# Patient Record
Sex: MALE | Race: WHITE | NOT HISPANIC OR LATINO | Employment: UNEMPLOYED | URBAN - METROPOLITAN AREA
[De-identification: names, ages, dates, MRNs, and addresses within clinical notes are randomized per-mention and may not be internally consistent; named-entity substitution may affect disease eponyms.]

---

## 2019-08-05 ENCOUNTER — HOSPITAL ENCOUNTER (EMERGENCY)
Facility: HOSPITAL | Age: 29
Discharge: HOME | End: 2019-08-05
Admitting: EMERGENCY MEDICINE

## 2019-08-05 ENCOUNTER — APPOINTMENT (OUTPATIENT)
Dept: GENERAL RADIOLOGY | Facility: HOSPITAL | Age: 29
End: 2019-08-05

## 2019-08-05 VITALS
DIASTOLIC BLOOD PRESSURE: 83 MMHG | SYSTOLIC BLOOD PRESSURE: 130 MMHG | TEMPERATURE: 98.2 F | OXYGEN SATURATION: 99 % | RESPIRATION RATE: 16 BRPM | HEART RATE: 56 BPM | BODY MASS INDEX: 28.75 KG/M2 | WEIGHT: 183.2 LBS | HEIGHT: 67 IN

## 2019-08-05 DIAGNOSIS — M25.522 LEFT ELBOW PAIN: Primary | ICD-10-CM

## 2019-08-05 PROCEDURE — 99283 EMERGENCY DEPT VISIT LOW MDM: CPT

## 2019-08-05 PROCEDURE — 73070 X-RAY EXAM OF ELBOW: CPT

## 2019-08-05 RX ORDER — IBUPROFEN 400 MG/1
800 TABLET ORAL ONCE
Status: COMPLETED | OUTPATIENT
Start: 2019-08-05 | End: 2019-08-05

## 2019-08-05 RX ORDER — DICLOFENAC SODIUM 75 MG/1
75 TABLET, DELAYED RELEASE ORAL 2 TIMES DAILY PRN
Qty: 20 TABLET | Refills: 0 | Status: SHIPPED | OUTPATIENT
Start: 2019-08-05 | End: 2019-08-17

## 2019-08-05 RX ADMIN — IBUPROFEN 800 MG: 400 TABLET ORAL at 16:05

## 2019-08-05 NOTE — DISCHARGE INSTRUCTIONS
Use diclofenac as needed for inflammation follow-up with Dr. Concepcion for further evaluation of elbow pain    Do not mix the medication with Motrin ibuprofen Advil or Aleve

## 2019-08-05 NOTE — ED PROVIDER NOTES
Subjective   Patient is a 28-year-old male with no known history to the left elbow pain and swelling. He states that is has been going on and off for a week.  He states that when he extends his arm it sometimes gets caught feels a snapping sensation in his elbow.  States it is worse when it starts to swell.  He has no numbness no tingling he denies any recent falls or injuries.  Denies fever chills            Review of Systems   Constitutional: Negative for chills, fatigue and fever.   HENT: Negative for congestion, tinnitus and trouble swallowing.    Eyes: Negative for photophobia, discharge and redness.   Respiratory: Negative for cough and shortness of breath.    Cardiovascular: Negative for chest pain and palpitations.   Gastrointestinal: Negative for abdominal pain, diarrhea, nausea and vomiting.   Genitourinary: Negative for dysuria, frequency and urgency.   Musculoskeletal: Negative for back pain, joint swelling and myalgias.   Skin: Negative for rash.   Neurological: Negative for dizziness and headaches.   Psychiatric/Behavioral: Negative for confusion.   All other systems reviewed and are negative.      No past medical history on file.    No Known Allergies    Past Surgical History:   Procedure Laterality Date   • TONSILLECTOMY         Family History   Problem Relation Age of Onset   • Diabetes Father        Social History     Socioeconomic History   • Marital status: Single     Spouse name: Not on file   • Number of children: Not on file   • Years of education: Not on file   • Highest education level: Not on file   Tobacco Use   • Smoking status: Current Every Day Smoker     Packs/day: 1.00           Objective   Physical Exam   Constitutional: He is oriented to person, place, and time. He appears well-developed and well-nourished.   HENT:   Head: Normocephalic and atraumatic.   Eyes: Conjunctivae and EOM are normal. Pupils are equal, round, and reactive to light.   Neck: Normal range of motion. Neck  supple.   Cardiovascular: Normal rate, regular rhythm, normal heart sounds and intact distal pulses.   Pulmonary/Chest: Effort normal and breath sounds normal.   Abdominal: Soft. Bowel sounds are normal.   Musculoskeletal:        Left elbow: He exhibits decreased range of motion and swelling. He exhibits no deformity and no laceration. Tenderness found. Radial head tenderness noted.        Arms:  Neurological: He is alert and oriented to person, place, and time. GCS eye subscore is 4. GCS verbal subscore is 5. GCS motor subscore is 6.   Skin: Skin is warm and dry.   Psychiatric: He has a normal mood and affect.   Vitals reviewed.      Procedures           ED Course      Labs Reviewed - No data to display  Medications   ibuprofen (ADVIL,MOTRIN) tablet 800 mg (800 mg Oral Given 8/5/19 1605)     Xr Elbow 2 View Left    Result Date: 8/5/2019  No acute abnormality is noted.  There are 2 chronic appearing calcifications in the joint.  Electronically Signed By-Sumaya Reis On:8/5/2019 5:01 PM This report was finalized on [id number] by  Sumaya Reis, .  He was placed in a sling and was distally neurovascular intact after placement.              MDM  The x-ray shows that the patient does have some chronic appearing calcifications within the joint which could be causing the catching sensation he will be sent to orthopedics given some diclofenac for pain control.  Verbalized understood discharge instructions    Final diagnoses:   Left elbow pain            Katelynn Wilson, APRN  08/05/19 8306

## 2019-08-20 ENCOUNTER — TELEPHONE (OUTPATIENT)
Dept: FAMILY MEDICINE CLINIC | Facility: CLINIC | Age: 29
End: 2019-08-20

## 2019-08-20 NOTE — TELEPHONE ENCOUNTER
Pt has an appt scheduled with you for 9/10 and pt had a mri done of his elbow . They believe he has a torn ligament and a chip of a bone floating around .  Pt need something for his pain , pt cannot work like this and pt is on his 90 day probation period and he is going to lose everything .

## 2019-08-20 NOTE — TELEPHONE ENCOUNTER
He has been seen in the office by Miguel, but not in well over a year  I have never seen him that I can find in the office and the one appointment he had with me he no showed  I do not have his MRI, I have his x-ray that was read as normal  I cannot prescribe medication without seeing  It sounds like he needs to see an orthopedist and I can give him a referral, but I would have to have that MRI report first, or I will not be able to get him an appointment

## 2019-08-20 NOTE — TELEPHONE ENCOUNTER
Pt went to see Dr. Dawson today and he gave him tramadol aand set pt up for surgery next Thursday

## 2019-08-24 ENCOUNTER — HOSPITAL ENCOUNTER (EMERGENCY)
Facility: HOSPITAL | Age: 29
Discharge: HOME | End: 2019-08-24
Admitting: EMERGENCY MEDICINE

## 2019-08-24 VITALS
DIASTOLIC BLOOD PRESSURE: 94 MMHG | BODY MASS INDEX: 28.37 KG/M2 | HEIGHT: 67 IN | RESPIRATION RATE: 16 BRPM | TEMPERATURE: 98.8 F | SYSTOLIC BLOOD PRESSURE: 149 MMHG | HEART RATE: 87 BPM | WEIGHT: 180.78 LBS | OXYGEN SATURATION: 99 %

## 2019-08-24 DIAGNOSIS — M25.522 LEFT ELBOW PAIN: Primary | ICD-10-CM

## 2019-08-24 PROCEDURE — 99282 EMERGENCY DEPT VISIT SF MDM: CPT

## 2019-08-24 RX ORDER — HYDROCODONE BITARTRATE AND ACETAMINOPHEN 5; 325 MG/1; MG/1
1 TABLET ORAL EVERY 6 HOURS PRN
Qty: 6 TABLET | Refills: 0 | OUTPATIENT
Start: 2019-08-24 | End: 2019-08-30

## 2019-08-24 RX ORDER — HYDROCODONE BITARTRATE AND ACETAMINOPHEN 5; 325 MG/1; MG/1
1 TABLET ORAL ONCE AS NEEDED
Status: DISCONTINUED | OUTPATIENT
Start: 2019-08-24 | End: 2019-08-24 | Stop reason: HOSPADM

## 2019-08-30 ENCOUNTER — HOSPITAL ENCOUNTER (EMERGENCY)
Facility: HOSPITAL | Age: 29
Discharge: HOME | End: 2019-08-30
Attending: EMERGENCY MEDICINE | Admitting: EMERGENCY MEDICINE

## 2019-08-30 VITALS
HEIGHT: 67 IN | WEIGHT: 187.39 LBS | SYSTOLIC BLOOD PRESSURE: 148 MMHG | HEART RATE: 88 BPM | BODY MASS INDEX: 29.41 KG/M2 | RESPIRATION RATE: 16 BRPM | OXYGEN SATURATION: 100 % | DIASTOLIC BLOOD PRESSURE: 77 MMHG | TEMPERATURE: 97.6 F

## 2019-08-30 DIAGNOSIS — S53.432A RADIAL COLLATERAL LIGAMENT SPRAIN OF LEFT ELBOW, INITIAL ENCOUNTER: Primary | ICD-10-CM

## 2019-08-30 DIAGNOSIS — M19.029 ELBOW ARTHRITIS: ICD-10-CM

## 2019-08-30 PROCEDURE — 99283 EMERGENCY DEPT VISIT LOW MDM: CPT

## 2019-08-30 RX ORDER — IBUPROFEN 400 MG/1
800 TABLET ORAL ONCE
Status: COMPLETED | OUTPATIENT
Start: 2019-08-30 | End: 2019-08-30

## 2019-08-30 RX ORDER — IBUPROFEN 400 MG/1
TABLET ORAL
Status: COMPLETED
Start: 2019-08-30 | End: 2019-08-30

## 2019-08-30 RX ORDER — TRAMADOL HYDROCHLORIDE 50 MG/1
50 TABLET ORAL EVERY 6 HOURS PRN
Qty: 12 TABLET | Refills: 0 | Status: SHIPPED | OUTPATIENT
Start: 2019-08-30 | End: 2019-09-01 | Stop reason: SDUPTHER

## 2019-08-30 RX ORDER — METHYLPREDNISOLONE 4 MG/1
TABLET ORAL
Qty: 1 EACH | Refills: 0 | Status: SHIPPED | OUTPATIENT
Start: 2019-08-30 | End: 2022-03-15

## 2019-08-30 RX ADMIN — IBUPROFEN 800 MG: 400 TABLET ORAL at 22:02

## 2019-09-01 ENCOUNTER — HOSPITAL ENCOUNTER (EMERGENCY)
Facility: HOSPITAL | Age: 29
Discharge: HOME | End: 2019-09-01
Admitting: EMERGENCY MEDICINE

## 2019-09-01 VITALS
OXYGEN SATURATION: 99 % | HEIGHT: 67 IN | RESPIRATION RATE: 17 BRPM | DIASTOLIC BLOOD PRESSURE: 78 MMHG | SYSTOLIC BLOOD PRESSURE: 122 MMHG | WEIGHT: 180.34 LBS | HEART RATE: 88 BPM | BODY MASS INDEX: 28.3 KG/M2 | TEMPERATURE: 98.1 F

## 2019-09-01 DIAGNOSIS — M25.522 LEFT ELBOW PAIN: ICD-10-CM

## 2019-09-01 DIAGNOSIS — Z78.9 HAS RUN OUT OF MEDICATIONS: Primary | ICD-10-CM

## 2019-09-01 PROCEDURE — 99283 EMERGENCY DEPT VISIT LOW MDM: CPT

## 2019-09-01 RX ORDER — TRAMADOL HYDROCHLORIDE 50 MG/1
50 TABLET ORAL ONCE
Status: COMPLETED | OUTPATIENT
Start: 2019-09-01 | End: 2019-09-01

## 2019-09-01 RX ORDER — TRAMADOL HYDROCHLORIDE 50 MG/1
50 TABLET ORAL ONCE
Status: DISCONTINUED | OUTPATIENT
Start: 2019-09-01 | End: 2019-09-01

## 2019-09-01 RX ORDER — TRAMADOL HYDROCHLORIDE 50 MG/1
50 TABLET ORAL EVERY 6 HOURS PRN
Qty: 8 TABLET | Refills: 0 | Status: SHIPPED | OUTPATIENT
Start: 2019-09-01 | End: 2019-09-10

## 2019-09-01 RX ADMIN — TRAMADOL HYDROCHLORIDE 50 MG: 50 TABLET, COATED ORAL at 19:49

## 2019-09-01 NOTE — ED NOTES
"Pt reports he has doubled up on his pain medication and it doesn't help. Reports he wants something to kill the pain. Reports he \"doesn't want a shit ton of medication just a little bit.\"      Penelope Rees LPN  09/01/19 1900    "

## 2019-09-01 NOTE — DISCHARGE INSTRUCTIONS
Take medications as prescribed, do not double up on your medication as discussed.  Rotate heat and ice every 2 hours while awake, on for 20 minutes.  Keep your appointments with Dr. Rayo and Dr. Bender.  The ER is not a place to refill your medications, and you have established providers that need to manage her medications.  Return to the ER for new or worsening symptoms.

## 2019-09-01 NOTE — ED NOTES
Pt reports elbow pain x couple months. Pt seen here 3 x this month for same. Pt is supposed to have surgery on elbow but unable to have surgery currently because he is within his 90 at his job. Pt reports we sent him home with tramadol 2 days ago and reports that is does not help. Reports that 2 visits ago he was sent home with Norco 5/325mg and that seems to help most.     Penelope Rees, LILY  09/01/19 7734

## 2019-09-02 NOTE — ED PROVIDER NOTES
"Subjective   28-year-old male presents for a refill of medications for his 6 month h/o L elbow pain.  Patient reports that he was seen here on 8/30/2019 and given tramadol which is \"It's not really helping, that is what Dr. Bender gave me. I just need something stronger. I just started a new job and I can't miss work for ninety days or they will fire me. I can't do the surgery until then. That one practitioner here gave me Nocro and that helped a whole lot more.\"     1. Location: Left elbow  2. Quality: Throbbing  3. Severity: Moderate  4. Worsening factors: Movement  5. Alleviating factors: Norco  6. Onset: 6 months  7. Radiation: None  8. Frequency: Constant with periods of intensity  9. Co-morbidities: Reported torn ligament of left elbow  10. Source: Patient                  Review of Systems   Musculoskeletal: Positive for arthralgias and myalgias. Negative for joint swelling.   Skin: Negative for color change and pallor.   Neurological: Negative for numbness.   All other systems reviewed and are negative.      No past medical history on file.    No Known Allergies    Past Surgical History:   Procedure Laterality Date   • DENTAL PROCEDURE     • TONSILLECTOMY         Family History   Problem Relation Age of Onset   • Diabetes Father        Social History     Socioeconomic History   • Marital status: Single     Spouse name: Not on file   • Number of children: Not on file   • Years of education: Not on file   • Highest education level: Not on file   Tobacco Use   • Smoking status: Current Every Day Smoker     Packs/day: 1.00   • Smokeless tobacco: Never Used   Substance and Sexual Activity   • Drug use: No           Objective   Physical Exam   Constitutional: He is oriented to person, place, and time. He appears well-developed and well-nourished. He is active.   Cardiovascular: Intact distal pulses.   Pulses:       Radial pulses are 2+ on the right side, and 2+ on the left side.   Musculoskeletal: He exhibits " tenderness. He exhibits no edema or deformity.        Left elbow: He exhibits decreased range of motion. He exhibits no swelling, no effusion, no deformity and no laceration. Tenderness found. Olecranon process tenderness noted. No radial head, no medial epicondyle and no lateral epicondyle tenderness noted.        Arms:  Neurological: He is alert and oriented to person, place, and time. No sensory deficit.   Skin: Skin is warm, dry and intact. Capillary refill takes less than 2 seconds. No rash noted.   Psychiatric: He has a normal mood and affect. His behavior is normal. Judgment and thought content normal.   Nursing note and vitals reviewed.      Procedures           ED Course      No radiology results for the last day  Medications   traMADol (ULTRAM) tablet 50 mg (50 mg Oral Given 9/1/19 1949)     Labs Reviewed - No data to display              MDM  Number of Diagnoses or Management Options  Has run out of medications:   Left elbow pain:   Diagnosis management comments: Chart Review: Patient seen for same complaint on 8/30/2019.  Comorbidity: Reported left elbow ligament torn  Imaging: Not warranted.  Disposition/Treatment: Discussed results with patient, verbalized understanding.  Agreeable with plan of care.    Patient undressed and placed in gown for exam.  Patient was given 1 tramadol.  Patient was encouraged to keep his appointment with his PCP and surgeon.  Encouraged to return to the ER for new or worsening symptoms.    Inspect performed.  Patient had tramadol quantity of 12 filled on 8/30/2019.  Patient given a prescription today for tramadol quantity of 8.          Final diagnoses:   Has run out of medications   Left elbow pain            Eneida Chamorro NP  09/01/19 2007

## 2019-09-10 ENCOUNTER — TELEPHONE (OUTPATIENT)
Dept: FAMILY MEDICINE CLINIC | Facility: CLINIC | Age: 29
End: 2019-09-10

## 2019-09-10 ENCOUNTER — OFFICE VISIT (OUTPATIENT)
Dept: FAMILY MEDICINE CLINIC | Facility: CLINIC | Age: 29
End: 2019-09-10

## 2019-09-10 VITALS
WEIGHT: 181.6 LBS | OXYGEN SATURATION: 97 % | HEART RATE: 82 BPM | BODY MASS INDEX: 28.5 KG/M2 | HEIGHT: 67 IN | SYSTOLIC BLOOD PRESSURE: 123 MMHG | DIASTOLIC BLOOD PRESSURE: 81 MMHG

## 2019-09-10 DIAGNOSIS — M25.522 LEFT ELBOW PAIN: ICD-10-CM

## 2019-09-10 DIAGNOSIS — Z00.01 ENCOUNTER FOR WELL ADULT EXAM WITH ABNORMAL FINDINGS: Primary | ICD-10-CM

## 2019-09-10 PROCEDURE — 99395 PREV VISIT EST AGE 18-39: CPT | Performed by: FAMILY MEDICINE

## 2019-09-10 RX ORDER — HYDROCODONE BITARTRATE 40 MG/1
1 TABLET, EXTENDED RELEASE ORAL DAILY
Qty: 30 EACH | Refills: 0 | Status: SHIPPED | OUTPATIENT
Start: 2019-09-10 | End: 2019-09-11 | Stop reason: SDUPTHER

## 2019-09-10 NOTE — PROGRESS NOTES
"Subjective   Bryce Lyon is a 28 y.o. male.     He is in today for his annual well visit but has been having some discomfort in his left arm of late. He had MRI done last month and he has significant issues with his radial collateral ligament, osteophytes and some tendon wear.  He has been told he needs surgery but is on a probationary period at work for the rest of the year, so he cannot have surgery for approx 3 mos or he will lose his job.           /81 (BP Location: Left arm, Patient Position: Sitting, Cuff Size: Adult)   Pulse 82   Ht 170.2 cm (67\")   Wt 82.4 kg (181 lb 9.6 oz)   SpO2 97%   BMI 28.44 kg/m²       Chief Complaint   Patient presents with   • Annual Exam           Current Outpatient Medications:   •  methylPREDNISolone (MEDROL, OLIVIER,) 4 MG tablet, Take as directed on package instructions., Disp: 1 each, Rfl: 0  •  traMADol (ULTRAM) 50 MG tablet, Take 1 tablet by mouth Every 6 (Six) Hours As Needed for Moderate Pain  or Severe Pain ., Disp: 8 tablet, Rfl: 0        The following portions of the patient's history were reviewed and updated as appropriate: allergies, current medications, past family history, past medical history, past social history, past surgical history and problem list.    Review of Systems   Constitutional: Negative for activity change, fatigue and fever.   HENT: Negative for congestion, sinus pressure, sinus pain, sore throat and trouble swallowing.    Eyes: Negative for visual disturbance.   Respiratory: Negative for chest tightness, shortness of breath and wheezing.    Cardiovascular: Negative for chest pain.   Gastrointestinal: Negative for abdominal distention, abdominal pain, constipation, diarrhea, nausea and vomiting.   Genitourinary: Negative for difficulty urinating, dysuria, frequency and urgency.   Musculoskeletal: Positive for arthralgias and myalgias. Negative for back pain and neck pain.   Skin: Negative for rash.   Neurological: Negative for dizziness, " weakness and light-headedness.   Psychiatric/Behavioral: Positive for sleep disturbance. Negative for agitation, hallucinations and suicidal ideas.       Objective   Physical Exam   Constitutional: He is oriented to person, place, and time. He appears well-developed and well-nourished. He appears distressed.   HENT:   Nose: Nose normal.   Mouth/Throat: Oropharynx is clear and moist. No oropharyngeal exudate.   Neck: Normal range of motion. Neck supple. No thyromegaly present.   Cardiovascular: Normal rate, regular rhythm and normal heart sounds.   Pulmonary/Chest: Effort normal and breath sounds normal.   Abdominal: Soft. Bowel sounds are normal. There is no guarding.   Musculoskeletal:   Some laxity in both shoulders  Unable to fully assess the left elbow due to patient refusal for examination but recent MRI indicates significant damage as mentioned above in the note  Gait normal   Lymphadenopathy:     He has no cervical adenopathy.   Neurological: He is alert and oriented to person, place, and time.   Psychiatric: He has a normal mood and affect.   Nursing note and vitals reviewed.        Assessment/Plan   Problems Addressed this Visit     None      Visit Diagnoses     Encounter for well adult exam with abnormal findings    -  Primary    Left elbow pain        Relevant Orders    Ambulatory Referral to Orthopedic Surgery      I did give the patient some pain medication, given the abnormal MRI  I gave him time release medication to try to minimize habitual nature, but if his elbow is as bad as I fear the ultimate resolution for this will be surgery  He is to update me on response to pain medication in the next couple of weeks and I will likely see him back in 2 months.  He was educated on preventive measures moving forward in regard to lifestyle/ diet  He was educated on better sleep hygiene as well          '

## 2019-09-11 ENCOUNTER — HOSPITAL ENCOUNTER (EMERGENCY)
Facility: HOSPITAL | Age: 29
Discharge: HOME | End: 2019-09-11
Admitting: EMERGENCY MEDICINE

## 2019-09-11 VITALS
OXYGEN SATURATION: 99 % | RESPIRATION RATE: 16 BRPM | HEIGHT: 67 IN | TEMPERATURE: 98 F | DIASTOLIC BLOOD PRESSURE: 90 MMHG | HEART RATE: 92 BPM | BODY MASS INDEX: 27.34 KG/M2 | SYSTOLIC BLOOD PRESSURE: 148 MMHG | WEIGHT: 174.16 LBS

## 2019-09-11 DIAGNOSIS — M25.522 LEFT ELBOW PAIN: Primary | ICD-10-CM

## 2019-09-11 PROCEDURE — 99283 EMERGENCY DEPT VISIT LOW MDM: CPT

## 2019-09-11 RX ORDER — INDOMETHACIN 25 MG/1
50 CAPSULE ORAL 3 TIMES DAILY PRN
Qty: 21 CAPSULE | Refills: 0 | Status: SHIPPED | OUTPATIENT
Start: 2019-09-11 | End: 2022-03-15

## 2019-09-11 RX ORDER — HYDROCODONE BITARTRATE 40 MG/1
1 TABLET, EXTENDED RELEASE ORAL DAILY
Qty: 30 EACH | Refills: 0 | Status: SHIPPED | OUTPATIENT
Start: 2019-09-11 | End: 2019-09-23 | Stop reason: SDUPTHER

## 2019-09-11 RX ORDER — IBUPROFEN 400 MG/1
800 TABLET ORAL ONCE
Status: COMPLETED | OUTPATIENT
Start: 2019-09-11 | End: 2019-09-11

## 2019-09-11 RX ADMIN — IBUPROFEN 800 MG: 400 TABLET ORAL at 01:10

## 2019-09-11 NOTE — ED PROVIDER NOTES
Subjective   This is the patient's fourth visit for left elbow pain.  He states that he saw his orthopedic surgeon was told that he needed to have surgery on his elbow he just started a new job and will be unable to have surgery for 3 months he states that his doctor called in [place] today but he was unable to get the prescription filled.            Review of Systems   Constitutional: Negative for fever.   Musculoskeletal: Positive for arthralgias.       No past medical history on file.    No Known Allergies    Past Surgical History:   Procedure Laterality Date   • DENTAL PROCEDURE     • TONSILLECTOMY         Family History   Problem Relation Age of Onset   • Diabetes Father        Social History     Socioeconomic History   • Marital status: Single     Spouse name: Not on file   • Number of children: Not on file   • Years of education: Not on file   • Highest education level: Not on file   Tobacco Use   • Smoking status: Current Every Day Smoker     Packs/day: 1.00   • Smokeless tobacco: Never Used   Substance and Sexual Activity   • Drug use: No           Objective   Physical Exam  28-year-old gentleman in no acute distress examination of the left elbow visually there is no obvious abnormality no bruising swelling deformity no erythema or warmth tender to palpation no palpable abnormality range of motion restricted due to pain is no crepitus  Procedures           ED Course      Patient will be given a prescription for indomethacin            MDM    Final diagnoses:   Left elbow pain              Connor Castillo, KHADIJAH  09/11/19 0043

## 2019-09-11 NOTE — ED NOTES
"Pt c/o L elbow pain \"for a while\". Pt requesting pain medication so that he can go to work. Pt states he has a pain medication RX at the pharmacy but the pharmacy is unable to fill the RX at this time.      Marcia Fitzpatrick, LPN  09/11/19 0112    "

## 2019-09-12 ENCOUNTER — TELEPHONE (OUTPATIENT)
Dept: FAMILY MEDICINE CLINIC | Facility: CLINIC | Age: 29
End: 2019-09-12

## 2019-09-17 ENCOUNTER — HOSPITAL ENCOUNTER (EMERGENCY)
Facility: HOSPITAL | Age: 29
Discharge: HOME | End: 2019-09-17
Attending: EMERGENCY MEDICINE | Admitting: EMERGENCY MEDICINE

## 2019-09-17 VITALS
HEIGHT: 67 IN | HEART RATE: 71 BPM | WEIGHT: 179.68 LBS | SYSTOLIC BLOOD PRESSURE: 134 MMHG | BODY MASS INDEX: 28.2 KG/M2 | DIASTOLIC BLOOD PRESSURE: 80 MMHG | TEMPERATURE: 97.4 F | OXYGEN SATURATION: 100 % | RESPIRATION RATE: 14 BRPM

## 2019-09-17 DIAGNOSIS — M25.522 LEFT ELBOW PAIN: Primary | ICD-10-CM

## 2019-09-17 PROCEDURE — 99283 EMERGENCY DEPT VISIT LOW MDM: CPT

## 2019-09-17 RX ORDER — IBUPROFEN 400 MG/1
800 TABLET ORAL ONCE
Status: COMPLETED | OUTPATIENT
Start: 2019-09-17 | End: 2019-09-17

## 2019-09-17 RX ADMIN — IBUPROFEN 800 MG: 400 TABLET ORAL at 04:11

## 2019-09-17 NOTE — ED NOTES
"Pt c/o L elbow pain and swelling \"for a few months\". States \"I had an MRI done and I have some torn ligaments. I just need the swelling to go down\". Pt reports pain and swelling increases when working.     Marcia Fitzpatrick, MARLENEN  09/17/19 0401    "

## 2019-09-17 NOTE — ED PROVIDER NOTES
Subjective   Chronic left elbow pain, no new injury, followed by orthopedist.  Moderate, worse with movement.            Review of Systems   Musculoskeletal:        As per hpi       No past medical history on file.    No Known Allergies    Past Surgical History:   Procedure Laterality Date   • DENTAL PROCEDURE     • TONSILLECTOMY         Family History   Problem Relation Age of Onset   • Diabetes Father        Social History     Socioeconomic History   • Marital status: Single     Spouse name: Not on file   • Number of children: Not on file   • Years of education: Not on file   • Highest education level: Not on file   Tobacco Use   • Smoking status: Current Every Day Smoker     Packs/day: 1.00   • Smokeless tobacco: Never Used   Substance and Sexual Activity   • Drug use: No           Objective   Physical Exam   Constitutional: He is oriented to person, place, and time. He appears well-developed and well-nourished.   HENT:   Head: Normocephalic and atraumatic.   Musculoskeletal:   Left elbow with normal inspection, nontender, pain medially with FROM.   Neurological: He is alert and oriented to person, place, and time.   LUE motor and sensation intact   Skin: Skin is warm and dry. Capillary refill takes less than 2 seconds.   Psychiatric: He has a normal mood and affect. His behavior is normal.       Procedures           ED Course                  MDM    Final diagnoses:   Left elbow pain              Julián Song MD  09/17/19 4629

## 2019-09-23 ENCOUNTER — TELEPHONE (OUTPATIENT)
Dept: FAMILY MEDICINE CLINIC | Facility: CLINIC | Age: 29
End: 2019-09-23

## 2019-09-23 ENCOUNTER — HOSPITAL ENCOUNTER (EMERGENCY)
Facility: HOSPITAL | Age: 29
Discharge: HOME | End: 2019-09-23
Attending: NURSE PRACTITIONER | Admitting: EMERGENCY MEDICINE

## 2019-09-23 VITALS
OXYGEN SATURATION: 97 % | RESPIRATION RATE: 18 BRPM | TEMPERATURE: 98.4 F | SYSTOLIC BLOOD PRESSURE: 131 MMHG | HEIGHT: 67 IN | WEIGHT: 185 LBS | DIASTOLIC BLOOD PRESSURE: 77 MMHG | BODY MASS INDEX: 29.03 KG/M2 | HEART RATE: 87 BPM

## 2019-09-23 DIAGNOSIS — M25.522 LEFT ELBOW PAIN: Primary | ICD-10-CM

## 2019-09-23 PROCEDURE — 99283 EMERGENCY DEPT VISIT LOW MDM: CPT

## 2019-09-23 RX ORDER — HYDROCODONE BITARTRATE AND ACETAMINOPHEN 7.5; 325 MG/1; MG/1
1 TABLET ORAL ONCE
Status: COMPLETED | OUTPATIENT
Start: 2019-09-23 | End: 2019-09-23

## 2019-09-23 RX ORDER — HYDROCODONE BITARTRATE 40 MG/1
1 TABLET, EXTENDED RELEASE ORAL DAILY
Qty: 30 EACH | Refills: 0 | Status: SHIPPED | OUTPATIENT
Start: 2019-09-23 | End: 2019-10-23 | Stop reason: SDUPTHER

## 2019-09-23 RX ADMIN — HYDROCODONE BITARTRATE AND ACETAMINOPHEN 1 TABLET: 7.5; 325 TABLET ORAL at 03:16

## 2019-09-23 NOTE — DISCHARGE INSTRUCTIONS
Follow-up with primary care physician and with orthopedic physician above for continued evaluation of symptoms, range of motion exercises daily to help reduce joint stiffness;    To the ER for worsening symptoms including increased pain, swelling discoloration or coldness of extremity, fever or chills

## 2019-09-23 NOTE — ED PROVIDER NOTES
Subjective   Context: 28-year-old male patient presents the ER for evaluation of pain to his left elbow; patient reports that he was evaluated by his primary care physician 2 weeks ago, states that he was having difficulty feeling his chronic pain medication, states that tonight at work he noticed increased tenderness to the area.  He reports no redness or swelling.  Denies direct injury or trauma.  Patient reports that because he is having persistent increased pain he has had increased anxiety.  Denies suicidal homicidal ideation; reports he is able to function in his normal daily activities but states that becoming very aggravated because he is unable to get his prescribed medication due to recurrence.      Location:   Quality:  Timing:  Duration:   Aggravating:  Alleviating:    PCP:  Girma            Review of Systems   Constitutional: Negative for fever.   Respiratory: Positive for cough and shortness of breath.    Cardiovascular: Positive for chest pain.   Musculoskeletal: Positive for arthralgias. Negative for myalgias, neck pain and neck stiffness.   Psychiatric/Behavioral: Positive for hallucinations and sleep disturbance. Negative for agitation, self-injury and suicidal ideas. The patient is nervous/anxious.      Prior to Admission medications    Medication Sig Start Date End Date Taking? Authorizing Provider   HYDROcodone Bitartrate ER 40 MG tablet extended-release 24 hour  Take 1 tablet by mouth Daily. 9/11/19   Alexander Rayo MD   indomethacin (INDOCIN) 25 MG capsule Take 2 capsules by mouth 3 (Three) Times a Day As Needed for Mild Pain . 9/11/19   Connor Castillo NP   methylPREDNISolone (MEDROL, OLIVIER,) 4 MG tablet Take as directed on package instructions. 8/30/19   Felipe Zapien MD         No past medical history on file.    No Known Allergies    Past Surgical History:   Procedure Laterality Date   • DENTAL PROCEDURE     • TONSILLECTOMY         Family History   Problem Relation Age of  Onset   • Diabetes Father        Social History     Socioeconomic History   • Marital status: Single     Spouse name: Not on file   • Number of children: Not on file   • Years of education: Not on file   • Highest education level: Not on file   Tobacco Use   • Smoking status: Current Every Day Smoker     Packs/day: 1.00   • Smokeless tobacco: Never Used   Substance and Sexual Activity   • Drug use: No           Objective   Physical Exam    Triage nursing notes, vital signs reviewed    Constitutional: Well-developed well-nourished, no acute distress is noted, appearance is nontoxic  HENT: Normocephalic, atraumatic, no facial symmetry, no slurred speech; pupils are PERRL with intact EOM  Cardiopulmonary:   Distal pulses 2+ and intact with rapid capillary refill; respirations regular nonlabored  Musculoskeletal: Wrist with palpation of the left elbow, there is no palpable effusion no overlying erythema or ecchymosis, no bursal swelling, range of motion is present but increases tenderness, no obvious bony abnormality is noted, range of motion left shoulder, wrist and fingers is present without palpable tenderness or edema, distal motor sensory vascular status is intact.  No hypertrophy or atrophy of musculature.  Neuro: Motor strength 5/5: Left radial ulnar median nerve function intact  SKIN: Skin flesh, warm dry intact, no petechial rash or lesion    Procedures           ED Course                  MDM  Number of Diagnoses or Management Options  Left elbow pain:   Risk of Complications, Morbidity, and/or Mortality  General comments: Comorbidities:  Past medical history, allergies, current medications family history social history noted above    Previous records reviewed:    Review and summarization of lab specimens, radiology:  ED tests reviewed by me    Consultation:    Differentials: Pain, sprain: There is no evidence of cellulitis, septic arthritis, bursitis on physical exam; this list is not all inclusive and does  not constitute the entirety of considered causes              INSPECT was performed patient was noted to have numerous prescriptions for pain medications; this time, the patient is instructed to follow-up with his primary care physician for clarification on his prescribed pain medication, he will be given hydrocodone in the ED however at this time the patient had to wait for a ride before he received.  Upon receiving, he voices understanding of the importance of follow-up as well as signs and symptoms require immediate return to ED he is discharged improved stable condition ambulatory with an upright steady gait.    Final diagnoses:   Left elbow pain              Dipika Chase, KHADIJAH  09/23/19 0322

## 2019-09-23 NOTE — ED NOTES
Pt reports he has torn ligaments in his left elbow, states he has not been able to get his pain medication from the pharmacy, also states he broke up with his girlfriend and is sad     Reno Aldridge RN  09/23/19 0058

## 2019-09-30 ENCOUNTER — TELEPHONE (OUTPATIENT)
Dept: FAMILY MEDICINE CLINIC | Facility: CLINIC | Age: 29
End: 2019-09-30

## 2019-09-30 NOTE — TELEPHONE ENCOUNTER
Hysingla ER has been approved # 30 per 30 days starting 9/24/19 it is approved for 91 day only   [id number]

## 2019-10-01 ENCOUNTER — HOSPITAL ENCOUNTER (EMERGENCY)
Facility: HOSPITAL | Age: 29
Discharge: HOME | End: 2019-10-01
Admitting: EMERGENCY MEDICINE

## 2019-10-01 VITALS
TEMPERATURE: 97.9 F | SYSTOLIC BLOOD PRESSURE: 115 MMHG | BODY MASS INDEX: 29.03 KG/M2 | DIASTOLIC BLOOD PRESSURE: 58 MMHG | HEART RATE: 75 BPM | OXYGEN SATURATION: 99 % | RESPIRATION RATE: 17 BRPM | HEIGHT: 67 IN | WEIGHT: 185 LBS

## 2019-10-01 DIAGNOSIS — M25.522 LEFT ELBOW PAIN: Primary | ICD-10-CM

## 2019-10-01 PROCEDURE — 99283 EMERGENCY DEPT VISIT LOW MDM: CPT

## 2019-10-01 RX ORDER — NAPROXEN 500 MG/1
500 TABLET ORAL 2 TIMES DAILY WITH MEALS
Qty: 12 TABLET | Refills: 0 | Status: SHIPPED | OUTPATIENT
Start: 2019-10-01 | End: 2021-03-11

## 2019-10-01 RX ORDER — TRAMADOL HYDROCHLORIDE 50 MG/1
50 TABLET ORAL ONCE
Status: COMPLETED | OUTPATIENT
Start: 2019-10-01 | End: 2019-10-01

## 2019-10-01 RX ADMIN — TRAMADOL HYDROCHLORIDE 50 MG: 50 TABLET, FILM COATED ORAL at 07:22

## 2019-10-01 NOTE — DISCHARGE INSTRUCTIONS
Continue to take Hysingla ER 40 mg for pain per prescription instructions.  May take naproxen 2 times a day as needed for pain and inflammation.  Take naproxen with food.  Do not mix with Advil, Aleve, ibuprofen or diclofenac.    Follow-up with primary care provider Dr. Rayo for further management of pain medications.  Follow-up with Dr. Concepcion for surgical management and further evaluation.    Return to ER for new or worsening symptoms, increased elbow pain, swelling, redness, signs of infection, chest pain, shortness of breath or fever.

## 2019-10-01 NOTE — ED PROVIDER NOTES
"Subjective   Patient is a 28-year-old  male with history of chronic left elbow pain who presents to the ER complaining of worsening left elbow pain.  Patient states that he has been having pain in his left elbow for months.  He denies any exacerbating injury, fall or trauma.  Patient sees his primary care provider Dr. Rayo and orthopedist Dr. Concepcion for management.  Patient states that he had MRI and x-ray done per Dr. Concepcion which showed \"torn ligaments\".  Patient verbalizes that Dr. Concepcion is suggesting for the patient to have surgery however patient states that due to his insurance and his new job he is able to have surgery at this time.  Patient has been seen at least 3 times in this ER for the past month for similar pain.  Patient was prescribed hydrocodone bitartrate ER 40 mg per his primary care Dr. Rayo, but the patient states that the long-lasting medication does not seem to help.  Patient reports taking it as prescribed.  Patient has no other complaints at this time        History provided by:  Patient      Review of Systems   Constitutional: Negative for fever.   HENT: Negative for sinus pressure, sore throat and trouble swallowing.    Respiratory: Negative for shortness of breath and wheezing.    Cardiovascular: Negative for chest pain.   Gastrointestinal: Negative for abdominal pain.   Genitourinary: Negative for dysuria.   Musculoskeletal: Positive for arthralgias and myalgias. Negative for joint swelling.        Left elbow pain   Skin: Negative for rash.   Neurological: Negative for headaches.   Psychiatric/Behavioral: Negative for behavioral problems.       No past medical history on file.    No Known Allergies    Past Surgical History:   Procedure Laterality Date   • DENTAL PROCEDURE     • TONSILLECTOMY         Family History   Problem Relation Age of Onset   • Diabetes Father        Social History     Socioeconomic History   • Marital status: Single     Spouse name: Not on " file   • Number of children: Not on file   • Years of education: Not on file   • Highest education level: Not on file   Tobacco Use   • Smoking status: Current Every Day Smoker     Packs/day: 1.00   • Smokeless tobacco: Never Used   Substance and Sexual Activity   • Drug use: No           Objective   Physical Exam   Constitutional: He is oriented to person, place, and time. He appears well-developed and well-nourished.   HENT:   Head: Normocephalic and atraumatic.   Eyes: EOM are normal. Pupils are equal, round, and reactive to light.   Cardiovascular: Normal rate, regular rhythm, normal heart sounds and intact distal pulses.   Pulmonary/Chest: Effort normal and breath sounds normal. He has no wheezes.   Abdominal: Soft. Bowel sounds are normal. There is no tenderness.   Musculoskeletal: He exhibits tenderness. He exhibits no edema.   Chronic elbow pain, minimal tenderness to palpation mostly on medial side of left elbow.  No erythema, edema, warmth or signs of infection.  No lacerations or abrasions.  Increased pain with left elbow extension.  Distal pulses present left upper extremity   Neurological: He is alert and oriented to person, place, and time.   Skin: Skin is warm and dry. Capillary refill takes less than 2 seconds. No rash noted.   Psychiatric: He has a normal mood and affect. His behavior is normal.   Vitals reviewed.      Procedures           ED Course  ED Course as of Oct 01 0811   Tue Oct 01, 2019   0746 On initial examination patient appeared tearful and uncomfortable, unwilling to move his left elbow.  On reexamination patient is sleeping quietly in the bed.  [MM]   0747 Inspect was queried and patient was noted to have fill prescription for Hysingla ER 40 mg on 9/24/2019 from Dr. Rayo, #30.  Patient has full month's prescription for pain medicine for his chronic elbow pain.  Patient will be discharged and advised to follow-up with his primary care provider as well as Dr. Concepcion.  [MM]     "  ED Course User Index  [MM] Ely Hager PA    /70   Pulse 83   Temp 98.1 °F (36.7 °C) (Oral)   Resp 18   Ht 170.2 cm (67\")   Wt 83.9 kg (185 lb)   SpO2 95%   BMI 28.98 kg/m²   Labs Reviewed - No data to display  Medications   traMADol (ULTRAM) tablet 50 mg (50 mg Oral Given 10/1/19 0722)     No radiology results for the last day                MDM  Number of Diagnoses or Management Options  Left elbow pain:   Diagnosis management comments: Patient was seen by myself in the ER.  There is no obvious deformities, abrasions or signs of trauma or signs of infection in left elbow.  Patient states that pain seems worse because it is uncontrolled with long-acting pain medication per Dr. Rayo.  Inspect was queried and patient was noted to have numerous prescriptions for pain medications.  Patient was advised to follow-up with his primary care provider for further management of his chronic left elbow pain as well as follow-up with Dr. Concepcion to discuss possible surgery.  Patient was given tramadol 50 mg in the ER and reports some pain relief.  Patient will be discharged with prescription for naproxen 500 to be taken twice a day, with meals for pain and inflammation.  Discussed discharge instructions and follow-up instructions with patient who verbalized understanding.  Discussed the patient signs and symptoms that require immediate return to the ED. she was discharged in improved stable condition, he was ambulatory with upright steady gait.      Final diagnoses:   Left elbow pain              Ely Hager PA  10/01/19 0811    "

## 2019-10-21 ENCOUNTER — TELEPHONE (OUTPATIENT)
Dept: FAMILY MEDICINE CLINIC | Facility: CLINIC | Age: 29
End: 2019-10-21

## 2019-10-23 RX ORDER — HYDROCODONE BITARTRATE 40 MG/1
1 TABLET, EXTENDED RELEASE ORAL DAILY
Qty: 30 EACH | Refills: 0 | Status: SHIPPED | OUTPATIENT
Start: 2019-10-23 | End: 2019-10-23 | Stop reason: SDUPTHER

## 2019-10-23 RX ORDER — HYDROCODONE BITARTRATE 40 MG/1
1 TABLET, EXTENDED RELEASE ORAL DAILY
Qty: 30 EACH | Refills: 0 | OUTPATIENT
Start: 2019-10-23 | End: 2020-04-23

## 2019-10-23 NOTE — TELEPHONE ENCOUNTER
Spoke to [facility] pharmacist and he stated that the hydrocodone  just needs a refill not a prior authorization .

## 2019-11-20 ENCOUNTER — OFFICE VISIT (OUTPATIENT)
Dept: FAMILY MEDICINE CLINIC | Facility: CLINIC | Age: 29
End: 2019-11-20

## 2019-11-20 VITALS
DIASTOLIC BLOOD PRESSURE: 79 MMHG | WEIGHT: 181.8 LBS | BODY MASS INDEX: 28.53 KG/M2 | SYSTOLIC BLOOD PRESSURE: 122 MMHG | HEART RATE: 89 BPM | HEIGHT: 67 IN | OXYGEN SATURATION: 100 % | TEMPERATURE: 97.9 F

## 2019-11-20 DIAGNOSIS — F32.A DEPRESSION, UNSPECIFIED DEPRESSION TYPE: Primary | ICD-10-CM

## 2019-11-20 DIAGNOSIS — F41.9 ANXIETY: ICD-10-CM

## 2019-11-20 PROCEDURE — 99213 OFFICE O/P EST LOW 20 MIN: CPT | Performed by: FAMILY MEDICINE

## 2019-11-20 RX ORDER — ALPRAZOLAM 0.25 MG/1
0.25 TABLET ORAL 2 TIMES DAILY PRN
Qty: 60 TABLET | Refills: 0 | Status: SHIPPED | OUTPATIENT
Start: 2019-11-20 | End: 2020-01-28

## 2019-11-20 RX ORDER — GABAPENTIN 300 MG/1
300 CAPSULE ORAL 3 TIMES DAILY
Qty: 90 CAPSULE | Refills: 3 | Status: SHIPPED | OUTPATIENT
Start: 2019-11-20 | End: 2019-12-27

## 2019-11-20 NOTE — PROGRESS NOTES
"Subjective   Bryce Lyon is a 29 y.o. male.     He is in regarding depression. He is starting to avoid places and groups of people. He has had a relationship end.  He has had a falling out with his good friend.  He feels isolated but has a good relationship with his family.  He does have a Religion but has stopped going because that is where his ex went.  He is not socializing or circulating with anyone at the moment.  He is working.  He is open to counseling.           /79 (BP Location: Left arm, Patient Position: Sitting, Cuff Size: Adult)   Pulse 89   Temp 97.9 °F (36.6 °C) (Oral)   Ht 170.2 cm (67\")   Wt 82.5 kg (181 lb 12.8 oz)   SpO2 100%   BMI 28.47 kg/m²       Chief Complaint   Patient presents with   • Depression     pt wants to discuss depression    • Anxiety           Current Outpatient Medications:   •  HYDROcodone Bitartrate ER 40 MG tablet extended-release 24 hour , Take 1 tablet by mouth Daily., Disp: 30 each, Rfl: 0  •  ALPRAZolam (XANAX) 0.25 MG tablet, Take 1 tablet by mouth 2 (Two) Times a Day As Needed for Anxiety., Disp: 60 tablet, Rfl: 0  •  gabapentin (NEURONTIN) 300 MG capsule, Take 1 capsule by mouth 3 (Three) Times a Day., Disp: 90 capsule, Rfl: 3  •  indomethacin (INDOCIN) 25 MG capsule, Take 2 capsules by mouth 3 (Three) Times a Day As Needed for Mild Pain ., Disp: 21 capsule, Rfl: 0  •  methylPREDNISolone (MEDROL, OLIVIER,) 4 MG tablet, Take as directed on package instructions., Disp: 1 each, Rfl: 0  •  naproxen (EC NAPROSYN) 500 MG EC tablet, Take 1 tablet by mouth 2 (Two) Times a Day With Meals., Disp: 12 tablet, Rfl: 0        The following portions of the patient's history were reviewed and updated as appropriate: allergies, current medications, past family history, past medical history, past social history, past surgical history and problem list.    Review of Systems   Musculoskeletal: Positive for arthralgias.   Neurological: Negative for dizziness, weakness, " light-headedness, numbness and headaches.   Psychiatric/Behavioral: Positive for dysphoric mood and sleep disturbance. Negative for hallucinations, self-injury and suicidal ideas. The patient is nervous/anxious.        Objective   Physical Exam   Constitutional: He is oriented to person, place, and time. He appears distressed.   Neck: Normal range of motion. Neck supple. No thyromegaly present.   Cardiovascular: Normal rate, regular rhythm and normal heart sounds.   Pulmonary/Chest: Effort normal and breath sounds normal. He has no wheezes.   Neurological: He is alert and oriented to person, place, and time. He displays normal reflexes. No sensory deficit.   Psychiatric:   Affect is flat  He also seems anxious  He is not visibly shaking but he is very much on edge   Nursing note and vitals reviewed.        Assessment/Plan   Problems Addressed this Visit     None      Visit Diagnoses     Depression, unspecified depression type    -  Primary    Relevant Medications    ALPRAZolam (XANAX) 0.25 MG tablet    Anxiety            I will start him on Viibryd and titrate him up to 20 mg over the next 4 weeks  I will give him some alprazolam he can use at a low dose, only as needed, for panic or anxiety episodes  I will refer for counseling and I will see him back in 2 months  He was told to call for concerns and to go to the emergency room should he develop any suicidal ideations

## 2019-11-25 ENCOUNTER — TELEPHONE (OUTPATIENT)
Dept: FAMILY MEDICINE CLINIC | Facility: CLINIC | Age: 29
End: 2019-11-25

## 2019-12-27 ENCOUNTER — TELEPHONE (OUTPATIENT)
Dept: FAMILY MEDICINE CLINIC | Facility: CLINIC | Age: 29
End: 2019-12-27

## 2019-12-27 NOTE — TELEPHONE ENCOUNTER
It does not work that way  They do make a time release version that is 600 mg that we can try   And could even do two of those a day at some point but I would start with one

## 2019-12-27 NOTE — TELEPHONE ENCOUNTER
Pt would like to know if you will increase his gabapentin from 300 mg taking one tablet tid to 900 mg once a day  ?

## 2019-12-27 NOTE — TELEPHONE ENCOUNTER
Pt would like to try the time release 600 mg , if you will send it to [facility] on [address]  ?

## 2020-01-03 PROBLEM — J45.909 ASTHMATIC BRONCHITIS: Status: ACTIVE | Noted: 2018-08-29

## 2020-01-03 PROBLEM — K52.9 GASTROENTERITIS: Status: ACTIVE | Noted: 2018-08-17

## 2020-01-03 PROBLEM — K04.7 DENTAL ABSCESS: Status: ACTIVE | Noted: 2018-06-02

## 2020-01-03 PROBLEM — F41.1 GENERALIZED ANXIETY DISORDER: Status: ACTIVE | Noted: 2017-05-18

## 2020-01-03 PROBLEM — M25.511 PAIN IN RIGHT SHOULDER: Status: ACTIVE | Noted: 2017-05-18

## 2020-01-03 PROBLEM — M25.529 ARTHRALGIA OF UPPER ARM: Status: ACTIVE | Noted: 2020-01-03

## 2020-01-21 ENCOUNTER — OFFICE VISIT (OUTPATIENT)
Dept: FAMILY MEDICINE CLINIC | Facility: CLINIC | Age: 30
End: 2020-01-21

## 2020-01-21 VITALS
TEMPERATURE: 98.2 F | DIASTOLIC BLOOD PRESSURE: 82 MMHG | SYSTOLIC BLOOD PRESSURE: 129 MMHG | HEART RATE: 79 BPM | BODY MASS INDEX: 29.07 KG/M2 | WEIGHT: 185.6 LBS | OXYGEN SATURATION: 96 %

## 2020-01-21 DIAGNOSIS — F41.1 GENERALIZED ANXIETY DISORDER: Primary | ICD-10-CM

## 2020-01-21 PROCEDURE — 99213 OFFICE O/P EST LOW 20 MIN: CPT | Performed by: FAMILY MEDICINE

## 2020-01-21 RX ORDER — GABAPENTIN 300 MG/1
600 CAPSULE ORAL 3 TIMES DAILY
Qty: 180 CAPSULE | Refills: 1 | Status: SHIPPED | OUTPATIENT
Start: 2020-01-21 | End: 2020-03-23

## 2020-01-21 RX ORDER — IBUPROFEN 600 MG/1
TABLET ORAL
COMMUNITY
End: 2021-03-11

## 2020-01-21 RX ORDER — GABAPENTIN 300 MG/1
300 CAPSULE ORAL DAILY
COMMUNITY
Start: 2020-01-16 | End: 2020-01-21 | Stop reason: SDUPTHER

## 2020-01-21 RX ORDER — METHOCARBAMOL 750 MG/1
750 TABLET, FILM COATED ORAL DAILY
COMMUNITY
Start: 2019-11-25

## 2020-01-21 NOTE — PROGRESS NOTES
Subjective   Bryce Lyon is a 29 y.o. male.     He is in for follow-up on his anxiety issues. He misplaced the Viibryd that I gave him and did not end up taking it. He feels the gabapentin has helped his sleep and mood and he feels better with that medication. The ER version of it was not tolerated but IR version has been tolerated. He has also changed jobs and he has found one that is more comfortable for him and that he likes much better. Sleep and mood are improved since last visit. He has been eating better and exercising.         /82 (BP Location: Right arm, Patient Position: Sitting, Cuff Size: Adult)   Pulse 79   Temp 98.2 °F (36.8 °C) (Oral)   Wt 84.2 kg (185 lb 9.6 oz)   SpO2 96%   BMI 29.07 kg/m²       Chief Complaint   Patient presents with   • Anxiety     med check           Current Outpatient Medications:   •  ALPRAZolam (XANAX) 0.25 MG tablet, Take 1 tablet by mouth 2 (Two) Times a Day As Needed for Anxiety., Disp: 60 tablet, Rfl: 0  •  gabapentin (NEURONTIN) 300 MG capsule, Take 2 capsules by mouth 3 (Three) Times a Day., Disp: 180 capsule, Rfl: 1  •  HYDROcodone Bitartrate ER 40 MG tablet extended-release 24 hour , Take 1 tablet by mouth Daily., Disp: 30 each, Rfl: 0  •  indomethacin (INDOCIN) 25 MG capsule, Take 2 capsules by mouth 3 (Three) Times a Day As Needed for Mild Pain ., Disp: 21 capsule, Rfl: 0  •  methocarbamol (ROBAXIN) 750 MG tablet, Take 750 mg by mouth Daily., Disp: , Rfl:   •  methylPREDNISolone (MEDROL, OLIVIER,) 4 MG tablet, Take as directed on package instructions., Disp: 1 each, Rfl: 0  •  naproxen (EC NAPROSYN) 500 MG EC tablet, Take 1 tablet by mouth 2 (Two) Times a Day With Meals., Disp: 12 tablet, Rfl: 0  •  ibuprofen (ADVIL,MOTRIN) 600 MG tablet, , Disp: , Rfl:         The following portions of the patient's history were reviewed and updated as appropriate: allergies, current medications, past family history, past medical history, past social history, past  surgical history and problem list.    Review of Systems   Constitutional: Negative for activity change, fatigue and fever.   HENT: Negative for congestion, sinus pressure, sinus pain, sore throat and trouble swallowing.    Eyes: Negative for visual disturbance.   Respiratory: Negative for chest tightness, shortness of breath and wheezing.    Cardiovascular: Negative for chest pain.   Gastrointestinal: Negative for abdominal distention, abdominal pain, constipation, diarrhea, nausea and vomiting.   Genitourinary: Negative for difficulty urinating, dysuria, frequency and urgency.   Musculoskeletal: Negative for back pain and neck pain.   Neurological: Negative for dizziness, weakness, light-headedness, numbness and headaches.   Psychiatric/Behavioral: Positive for sleep disturbance. Negative for agitation, decreased concentration, hallucinations and suicidal ideas. The patient is not nervous/anxious.        Objective   Physical Exam   Constitutional: He is oriented to person, place, and time. No distress.   Neck: Normal range of motion. Neck supple. No thyromegaly present.   Cardiovascular: Normal rate, regular rhythm and normal heart sounds.   Pulmonary/Chest: Effort normal and breath sounds normal. He has no wheezes.   Abdominal: Soft. Bowel sounds are normal. There is no guarding.   Lymphadenopathy:     He has no cervical adenopathy.   Neurological: He is alert and oriented to person, place, and time. He displays normal reflexes.   Psychiatric:   Mood seems improved  Pt seems upbeat  No signs of depression   Nursing note and vitals reviewed.        Assessment/Plan   Problems Addressed this Visit        Other    Generalized anxiety disorder - Primary          I will change his gabapentin to the more immediate release and do 600 mg 3 times a day  I will not prescribe Viibryd as he did not take it and he seems to be improved without it  I will see him back in 3 months  I will likely get a urine drug screen between now  and the time he comes back in 3 months as part of our compliance program  I will not change any other prescriptions for now barring any unexpected findings on urine drug screen

## 2020-01-28 DIAGNOSIS — F41.1 GENERALIZED ANXIETY DISORDER: Primary | ICD-10-CM

## 2020-01-28 RX ORDER — ALPRAZOLAM 0.25 MG/1
TABLET ORAL
Qty: 60 TABLET | Refills: 0 | OUTPATIENT
Start: 2020-01-28 | End: 2020-04-23

## 2020-01-28 RX ORDER — ALPRAZOLAM 0.25 MG/1
0.25 TABLET ORAL 2 TIMES DAILY PRN
Qty: 60 TABLET | Refills: 0 | Status: SHIPPED | OUTPATIENT
Start: 2020-01-28 | End: 2020-04-19

## 2020-03-23 RX ORDER — GABAPENTIN 300 MG/1
600 CAPSULE ORAL 3 TIMES DAILY
Qty: 180 CAPSULE | Refills: 1 | Status: SHIPPED | OUTPATIENT
Start: 2020-03-23 | End: 2020-05-15

## 2020-04-18 DIAGNOSIS — F41.1 GENERALIZED ANXIETY DISORDER: ICD-10-CM

## 2020-04-19 RX ORDER — ALPRAZOLAM 0.25 MG/1
TABLET ORAL
Qty: 60 TABLET | Refills: 1 | OUTPATIENT
Start: 2020-04-19 | End: 2020-04-23

## 2020-04-20 ENCOUNTER — APPOINTMENT (OUTPATIENT)
Dept: GENERAL RADIOLOGY | Facility: HOSPITAL | Age: 30
End: 2020-04-20

## 2020-04-20 ENCOUNTER — HOSPITAL ENCOUNTER (EMERGENCY)
Facility: HOSPITAL | Age: 30
Discharge: HOME | End: 2020-04-20
Attending: EMERGENCY MEDICINE | Admitting: EMERGENCY MEDICINE

## 2020-04-20 VITALS
DIASTOLIC BLOOD PRESSURE: 63 MMHG | SYSTOLIC BLOOD PRESSURE: 124 MMHG | HEIGHT: 66 IN | HEART RATE: 80 BPM | BODY MASS INDEX: 31 KG/M2 | OXYGEN SATURATION: 97 % | TEMPERATURE: 98 F | RESPIRATION RATE: 14 BRPM | WEIGHT: 192.9 LBS

## 2020-04-20 DIAGNOSIS — K62.89 RECTAL PAIN: Primary | ICD-10-CM

## 2020-04-20 LAB
C TRACH RRNA CVX QL NAA+PROBE: NOT DETECTED
N GONORRHOEA RRNA SPEC QL NAA+PROBE: NOT DETECTED

## 2020-04-20 PROCEDURE — 87491 CHLMYD TRACH DNA AMP PROBE: CPT | Performed by: EMERGENCY MEDICINE

## 2020-04-20 PROCEDURE — 74018 RADEX ABDOMEN 1 VIEW: CPT

## 2020-04-20 PROCEDURE — 25010000002 CEFTRIAXONE PER 250 MG: Performed by: EMERGENCY MEDICINE

## 2020-04-20 PROCEDURE — 87591 N.GONORRHOEAE DNA AMP PROB: CPT | Performed by: EMERGENCY MEDICINE

## 2020-04-20 PROCEDURE — 99284 EMERGENCY DEPT VISIT MOD MDM: CPT

## 2020-04-20 PROCEDURE — 96372 THER/PROPH/DIAG INJ SC/IM: CPT

## 2020-04-20 RX ORDER — AZITHROMYCIN 250 MG/1
1000 TABLET, FILM COATED ORAL ONCE
Status: COMPLETED | OUTPATIENT
Start: 2020-04-20 | End: 2020-04-20

## 2020-04-20 RX ADMIN — AZITHROMYCIN MONOHYDRATE 1000 MG: 250 TABLET ORAL at 18:07

## 2020-04-20 RX ADMIN — LIDOCAINE HYDROCHLORIDE 250 MG: 10 INJECTION, SOLUTION EPIDURAL; INFILTRATION; INTRACAUDAL; PERINEURAL at 18:07
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
## 2020-04-20 NOTE — ED NOTES
Patient received IM injection.  Will wait 15 minutes before leaving to make sure he doesn't have a reaction.      Ange Cerda RN  04/20/20 [id number]

## 2020-04-23 ENCOUNTER — HOSPITAL ENCOUNTER (EMERGENCY)
Facility: HOSPITAL | Age: 30
Discharge: HOME | End: 2020-04-23
Admitting: EMERGENCY MEDICINE

## 2020-04-23 VITALS
HEART RATE: 104 BPM | RESPIRATION RATE: 18 BRPM | DIASTOLIC BLOOD PRESSURE: 88 MMHG | WEIGHT: 192 LBS | OXYGEN SATURATION: 99 % | SYSTOLIC BLOOD PRESSURE: 130 MMHG | HEIGHT: 66 IN | BODY MASS INDEX: 30.86 KG/M2 | TEMPERATURE: 98.5 F

## 2020-04-23 DIAGNOSIS — S20.319D: ICD-10-CM

## 2020-04-23 DIAGNOSIS — T40.1X4A DIACETYLMORPHINE OVERDOSE, UNDETERMINED INTENT, INITIAL ENCOUNTER: Primary | ICD-10-CM

## 2020-04-23 LAB
ANION GAP SERPL CALCULATED.3IONS-SCNC: 15 MMOL/L (ref 5–15)
BASOPHILS # BLD MANUAL: 0.17 10*3/MM3 (ref 0–0.2)
BASOPHILS NFR BLD AUTO: 2 % (ref 0–1.5)
BUN BLD-MCNC: 12 MG/DL (ref 6–20)
BUN/CREAT SERPL: 9.2 (ref 7–25)
CALCIUM SPEC-SCNC: 9.6 MG/DL (ref 8.6–10.5)
CHLORIDE SERPL-SCNC: 99 MMOL/L (ref 98–107)
CO2 SERPL-SCNC: 26 MMOL/L (ref 22–29)
CREAT BLD-MCNC: 1.3 MG/DL (ref 0.76–1.27)
DEPRECATED RDW RBC AUTO: 41.1 FL (ref 37–54)
EOSINOPHIL # BLD MANUAL: 0.17 10*3/MM3 (ref 0–0.4)
EOSINOPHIL NFR BLD MANUAL: 2 % (ref 0.3–6.2)
ERYTHROCYTE [DISTWIDTH] IN BLOOD BY AUTOMATED COUNT: 13.7 % (ref 12.3–15.4)
GFR SERPL CREATININE-BSD FRML MDRD: 66 ML/MIN/1.73
GIANT PLATELETS: ABNORMAL
GLUCOSE BLD-MCNC: 196 MG/DL (ref 65–99)
HCT VFR BLD AUTO: 49 % (ref 37.5–51)
HGB BLD-MCNC: 16.3 G/DL (ref 13–17.7)
LYMPHOCYTES # BLD MANUAL: 2.32 10*3/MM3 (ref 0.7–3.1)
LYMPHOCYTES NFR BLD MANUAL: 27 % (ref 19.6–45.3)
LYMPHOCYTES NFR BLD MANUAL: 7 % (ref 5–12)
MCH RBC QN AUTO: 28.7 PG (ref 26.6–33)
MCHC RBC AUTO-ENTMCNC: 33.2 G/DL (ref 31.5–35.7)
MCV RBC AUTO: 86.4 FL (ref 79–97)
METAMYELOCYTES NFR BLD MANUAL: 1 % (ref 0–0)
MONOCYTES # BLD AUTO: 0.6 10*3/MM3 (ref 0.1–0.9)
NEUTROPHILS # BLD AUTO: 5.25 10*3/MM3 (ref 1.7–7)
NEUTROPHILS NFR BLD MANUAL: 56 % (ref 42.7–76)
NEUTS BAND NFR BLD MANUAL: 5 % (ref 0–5)
PLATELET # BLD AUTO: 190 10*3/MM3 (ref 140–450)
PMV BLD AUTO: 8.5 FL (ref 6–12)
POTASSIUM BLD-SCNC: 4.3 MMOL/L (ref 3.5–5.2)
RBC # BLD AUTO: 5.67 10*6/MM3 (ref 4.14–5.8)
RBC MORPH BLD: NORMAL
SCAN SLIDE: NORMAL
SODIUM BLD-SCNC: 140 MMOL/L (ref 136–145)
WBC MORPH BLD: NORMAL
WBC NRBC COR # BLD: 8.6 10*3/MM3 (ref 3.4–10.8)

## 2020-04-23 PROCEDURE — 99284 EMERGENCY DEPT VISIT MOD MDM: CPT

## 2020-04-23 PROCEDURE — 85007 BL SMEAR W/DIFF WBC COUNT: CPT | Performed by: NURSE PRACTITIONER

## 2020-04-23 PROCEDURE — 80048 BASIC METABOLIC PNL TOTAL CA: CPT | Performed by: NURSE PRACTITIONER

## 2020-04-23 PROCEDURE — 85025 COMPLETE CBC W/AUTO DIFF WBC: CPT | Performed by: NURSE PRACTITIONER

## 2020-04-23 RX ORDER — SODIUM CHLORIDE 0.9 % (FLUSH) 0.9 %
10 SYRINGE (ML) INJECTION AS NEEDED
Status: DISCONTINUED | OUTPATIENT
Start: 2020-04-23 | End: 2020-04-24 | Stop reason: HOSPADM

## 2020-04-23 RX ADMIN — SODIUM CHLORIDE 1000 ML: 900 INJECTION, SOLUTION INTRAVENOUS at 21:44

## 2020-04-24 NOTE — ED PROVIDER NOTES
Subjective   Patient is a 29-year-old male who comes in via EMS after having a heroin overdose where he was found unconscious and was given 4 mg of Narcan nasally with return of spontaneous respirations.-    The patient reports that he snorted heroin-he states he has been doing it for about a month now.    The patient denies any pain.    EMS reports that the patient was also picked up for overdose on Sunday and was given Narcan and sternal rub-he was taken to [facility] at that time.          Review of Systems   Constitutional: Negative for chills, fatigue and fever.   HENT: Negative for congestion, tinnitus and trouble swallowing.    Eyes: Negative for photophobia, discharge and redness.   Respiratory: Negative for cough and shortness of breath.    Cardiovascular: Negative for chest pain and palpitations.   Gastrointestinal: Negative for abdominal pain, diarrhea, nausea and vomiting.   Genitourinary: Negative for dysuria, frequency and urgency.   Musculoskeletal: Negative for back pain, joint swelling and myalgias.   Skin: Positive for wound. Negative for rash.   Neurological: Negative for dizziness and headaches.   Psychiatric/Behavioral: Negative for confusion. The patient is nervous/anxious.         Drug overdose   All other systems reviewed and are negative.      Past Medical History:   Diagnosis Date   • Arm pain        No Known Allergies    Past Surgical History:   Procedure Laterality Date   • DENTAL PROCEDURE     • TONSILLECTOMY         Family History   Problem Relation Age of Onset   • Diabetes Father        Social History     Socioeconomic History   • Marital status: Single     Spouse name: Not on file   • Number of children: Not on file   • Years of education: Not on file   • Highest education level: Not on file   Tobacco Use   • Smoking status: Current Every Day Smoker     Packs/day: 1.00   • Smokeless tobacco: Never Used   Substance and Sexual Activity   • Drug use: No   • Sexual activity: Defer  "          Objective   Physical Exam   Constitutional: He is oriented to person, place, and time. He appears well-developed and well-nourished.   HENT:   Head: Normocephalic and atraumatic.   Eyes: Pupils are equal, round, and reactive to light. Conjunctivae and EOM are normal.   Neck: Normal range of motion. Neck supple.   Cardiovascular: Normal rate, regular rhythm, normal heart sounds and intact distal pulses.   Pulmonary/Chest: Effort normal and breath sounds normal.   Abdominal: Soft. Bowel sounds are normal.   Musculoskeletal: Normal range of motion.   Neurological: He is alert and oriented to person, place, and time. He has normal strength. GCS eye subscore is 4. GCS verbal subscore is 5. GCS motor subscore is 6.   Skin: Skin is warm, dry and intact. Capillary refill takes less than 2 seconds.        Psychiatric: He has a normal mood and affect.   Vitals reviewed.      Procedures           ED Course      /88   Pulse 104   Temp 98.5 °F (36.9 °C)   Resp 18   Ht 167.6 cm (66\")   Wt 87.1 kg (192 lb)   SpO2 99%   BMI 30.99 kg/m²   Labs Reviewed   BASIC METABOLIC PANEL - Abnormal; Notable for the following components:       Result Value    Glucose 196 (*)     Creatinine 1.30 (*)     All other components within normal limits    Narrative:     GFR Normal >60  Chronic Kidney Disease <60  Kidney Failure <15     MANUAL DIFFERENTIAL - Abnormal; Notable for the following components:    Basophil % 2.0 (*)     Metamyelocyte % 1.0 (*)     All other components within normal limits   CBC WITH AUTO DIFFERENTIAL - Normal   SCAN SLIDE   URINE DRUG SCREEN   CBC AND DIFFERENTIAL    Narrative:     The following orders were created for panel order CBC & Differential.  Procedure                               Abnormality         Status                     ---------                               -----------         ------                     CBC Auto Differential[[id number]]        Normal              Final result             "     Please view results for these tests on the individual orders.     Medications   sodium chloride 0.9 % flush 10 mL (has no administration in time range)   sodium chloride 0.9 % bolus 1,000 mL (1,000 mL Intravenous New Bag 4/23/20 4749)     No radiology results for the last day                                       MDM  Number of Diagnoses or Management Options  Diacetylmorphine overdose, undetermined intent, initial encounter (CMS/[facility]):   Diagnosis management comments: Appropriate PPE was used in the evaluation of this patient.    Patient had CBC/BMP-and was alert oriented and nontoxic had no further loss of consciousness no vomiting or fever while in the emergency room and the patient will be discharged home on his own accord    Spoke with him at length about seeking care for his drug addiction I gave him information for the addiction helpline       Amount and/or Complexity of Data Reviewed  Clinical lab tests: reviewed    Patient Progress  Patient progress: improved      Final diagnoses:   Diacetylmorphine overdose, undetermined intent, initial encounter (CMS/[facility])   Abrasion of sternal region, unspecified laterality, subsequent encounter            Katelynn Wilson, APRN  04/23/20 9489

## 2020-05-15 RX ORDER — GABAPENTIN 300 MG/1
CAPSULE ORAL
Qty: 180 CAPSULE | Refills: 1 | Status: SHIPPED | OUTPATIENT
Start: 2020-05-15 | End: 2020-07-06 | Stop reason: SDUPTHER

## 2020-06-19 DIAGNOSIS — F41.1 GENERALIZED ANXIETY DISORDER: ICD-10-CM

## 2020-06-20 RX ORDER — ALPRAZOLAM 0.25 MG/1
TABLET ORAL
Qty: 60 TABLET | Refills: 1 | OUTPATIENT
Start: 2020-06-20

## 2020-06-22 DIAGNOSIS — F41.1 GENERALIZED ANXIETY DISORDER: ICD-10-CM

## 2020-06-22 RX ORDER — ALPRAZOLAM 0.25 MG/1
TABLET ORAL
Qty: 60 TABLET | Refills: 1 | OUTPATIENT
Start: 2020-06-22

## 2020-07-06 RX ORDER — GABAPENTIN 300 MG/1
600 CAPSULE ORAL 3 TIMES DAILY
Qty: 180 CAPSULE | Refills: 1 | Status: SHIPPED | OUTPATIENT
Start: 2020-07-06 | End: 2020-08-04 | Stop reason: SDUPTHER

## 2020-07-20 ENCOUNTER — OFFICE VISIT (OUTPATIENT)
Dept: FAMILY MEDICINE CLINIC | Facility: CLINIC | Age: 30
End: 2020-07-20

## 2020-07-20 VITALS
BODY MASS INDEX: 31.8 KG/M2 | OXYGEN SATURATION: 94 % | HEART RATE: 67 BPM | WEIGHT: 197 LBS | DIASTOLIC BLOOD PRESSURE: 84 MMHG | TEMPERATURE: 98.4 F | SYSTOLIC BLOOD PRESSURE: 125 MMHG

## 2020-07-20 DIAGNOSIS — M25.522 CHRONIC PAIN OF LEFT ELBOW: ICD-10-CM

## 2020-07-20 DIAGNOSIS — M25.311 INSTABILITY OF RIGHT SHOULDER JOINT: ICD-10-CM

## 2020-07-20 DIAGNOSIS — G89.29 CHRONIC PAIN OF LEFT ELBOW: ICD-10-CM

## 2020-07-20 DIAGNOSIS — F41.1 GENERALIZED ANXIETY DISORDER: Primary | ICD-10-CM

## 2020-07-20 PROCEDURE — 99214 OFFICE O/P EST MOD 30 MIN: CPT | Performed by: FAMILY MEDICINE

## 2020-07-20 RX ORDER — ALPRAZOLAM 0.5 MG/1
0.5 TABLET ORAL 2 TIMES DAILY PRN
Qty: 60 TABLET | Refills: 1 | Status: SHIPPED | OUTPATIENT
Start: 2020-07-20 | End: 2020-09-15

## 2020-07-20 RX ORDER — ALPRAZOLAM 0.25 MG/1
0.5 TABLET ORAL 2 TIMES DAILY PRN
COMMUNITY
Start: 2020-05-22 | End: 2020-07-20 | Stop reason: SDUPTHER

## 2020-07-20 NOTE — PROGRESS NOTES
Subjective   Bryce Lyon is a 29 y.o. male.     He is in regarding chronic anxiety and some arm / shoulder issues.  He feels his anxiety has been increased of late.  He is presently homeless and his girlfriend is pregnant.  They have been sleeping in his car but are hopeful of having an apartment soon.  He has been taking an extra dose on the alprazolam to try to keep his anxiety comfortable.  He is much more concerned with some pain issues.  He has been bothered by very painful and weak right shoulder for a few months.  He states he was lifting something and felt something pop.  He can do very little with that right arm at present due to severe pain.  He is not having any numbness or tingling in the arm.  He is having pain radiating back up toward his neck.  He still has some pain in his left forearm which is not new.  It has not improved but he has not had any treatment.  It is in the forearm and elbow area without any swelling or new injury.  He has not had any numbness or tingling in the left arm or hand.  He is having significant difficulty and discomfort doing his ADLs.  He has not had any issue with bowel or bladder function.  He has not had great sleep but it is circumstantial.  He has not had any chest pain or difficulty breathing.  He has tried over-the-counter medication for the shoulder and arm pains without success.  He has not had any fever or illness symptoms.         /84 (BP Location: Right arm, Patient Position: Sitting, Cuff Size: Adult)   Pulse 67   Temp 98.4 °F (36.9 °C) (Temporal)   Wt 89.4 kg (197 lb)   SpO2 94%   BMI 31.80 kg/m²       Chief Complaint   Patient presents with   • Anxiety     3 month f/u   • Arm Pain     let arm - referral - torn somthing in his arm    • Shoulder Pain     right shoulder - sharp pain going into neck - 3 to 4 months           Current Outpatient Medications:   •  ALPRAZolam (XANAX) 0.25 MG tablet, Take 0.25 mg by mouth 2 (Two) Times a Day As Needed.,  Disp: , Rfl:   •  gabapentin (NEURONTIN) 300 MG capsule, Take 2 capsules by mouth 3 (Three) Times a Day., Disp: 180 capsule, Rfl: 1  •  ibuprofen (ADVIL,MOTRIN) 600 MG tablet, , Disp: , Rfl:   •  indomethacin (INDOCIN) 25 MG capsule, Take 2 capsules by mouth 3 (Three) Times a Day As Needed for Mild Pain ., Disp: 21 capsule, Rfl: 0  •  methocarbamol (ROBAXIN) 750 MG tablet, Take 750 mg by mouth Daily., Disp: , Rfl:   •  methylPREDNISolone (MEDROL, OLIVIER,) 4 MG tablet, Take as directed on package instructions., Disp: 1 each, Rfl: 0  •  naproxen (EC NAPROSYN) 500 MG EC tablet, Take 1 tablet by mouth 2 (Two) Times a Day With Meals., Disp: 12 tablet, Rfl: 0        The following portions of the patient's history were reviewed and updated as appropriate: allergies, current medications, past family history, past medical history, past social history, past surgical history and problem list.    Review of Systems   Constitutional: Negative for activity change, fatigue and fever.   HENT: Negative for congestion, sinus pressure, sinus pain, sore throat and trouble swallowing.    Eyes: Negative for visual disturbance.   Respiratory: Negative for chest tightness, shortness of breath and wheezing.    Cardiovascular: Negative for chest pain.   Gastrointestinal: Negative for abdominal distention, abdominal pain, constipation, diarrhea, nausea and vomiting.   Genitourinary: Negative for difficulty urinating and dysuria.   Musculoskeletal: Positive for arthralgias and myalgias. Negative for back pain and neck pain.   Psychiatric/Behavioral: Positive for sleep disturbance. Negative for agitation, hallucinations and suicidal ideas. The patient is nervous/anxious.        Objective   Physical Exam   Constitutional: He is oriented to person, place, and time. He appears distressed.   Neck: Neck supple. No thyromegaly present.   Cardiovascular: Normal rate, regular rhythm and normal heart sounds.   Pulmonary/Chest: Effort normal and breath  sounds normal.   Abdominal: Soft. Bowel sounds are normal.   Musculoskeletal:   He has a very unstable right shoulder and his collarbone appears misaligned on the right  Left arm shows a normal shoulder but he has discomfort in the proximal forearm and elbow, particularly it is uncomfortable with supination and pronation   Lymphadenopathy:     He has no cervical adenopathy.   Neurological: He is alert and oriented to person, place, and time. He displays normal reflexes.   Psychiatric: He has a normal mood and affect.   Nursing note and vitals reviewed.        Assessment/Plan   Problems Addressed this Visit        Other    Generalized anxiety disorder - Primary    Relevant Medications    ALPRAZolam (XANAX) 0.5 MG tablet      Other Visit Diagnoses     Instability of right shoulder joint        Relevant Orders    Ambulatory Referral to Orthopedic Surgery    MRI Shoulder Right Without Contrast    Chronic pain of left elbow        Relevant Orders    Ambulatory Referral to Orthopedic Surgery      I will refer to 1 of our orthopedists for evaluation and I will try to get an MRI of the right shoulder as I am terribly concerned about that joint  I will increase his alprazolam for now 0.5 mg twice daily  He is going to continue working on better living arrangements, which will help him  I will see him back in 6 months at the latest, sooner if there are new issues

## 2020-08-04 ENCOUNTER — TELEPHONE (OUTPATIENT)
Dept: FAMILY MEDICINE CLINIC | Facility: CLINIC | Age: 30
End: 2020-08-04

## 2020-08-04 RX ORDER — GABAPENTIN 300 MG/1
600 CAPSULE ORAL 3 TIMES DAILY
Qty: 270 CAPSULE | Refills: 1 | Status: SHIPPED | OUTPATIENT
Start: 2020-08-04 | End: 2020-11-18 | Stop reason: SDUPTHER

## 2020-09-15 DIAGNOSIS — F41.1 GENERALIZED ANXIETY DISORDER: ICD-10-CM

## 2020-09-15 RX ORDER — ALPRAZOLAM 0.5 MG/1
TABLET ORAL
Qty: 60 TABLET | Refills: 1 | Status: SHIPPED | OUTPATIENT
Start: 2020-09-15 | End: 2020-11-30

## 2020-10-30 ENCOUNTER — OFFICE VISIT (OUTPATIENT)
Dept: FAMILY MEDICINE CLINIC | Facility: CLINIC | Age: 30
End: 2020-10-30

## 2020-10-30 VITALS
TEMPERATURE: 97.7 F | WEIGHT: 181 LBS | HEART RATE: 103 BPM | BODY MASS INDEX: 29.21 KG/M2 | SYSTOLIC BLOOD PRESSURE: 127 MMHG | DIASTOLIC BLOOD PRESSURE: 74 MMHG | OXYGEN SATURATION: 99 %

## 2020-10-30 DIAGNOSIS — F41.1 GENERALIZED ANXIETY DISORDER: ICD-10-CM

## 2020-10-30 DIAGNOSIS — M25.511 CHRONIC RIGHT SHOULDER PAIN: ICD-10-CM

## 2020-10-30 DIAGNOSIS — R55 SYNCOPE, UNSPECIFIED SYNCOPE TYPE: Primary | ICD-10-CM

## 2020-10-30 DIAGNOSIS — G89.29 CHRONIC RIGHT SHOULDER PAIN: ICD-10-CM

## 2020-10-30 DIAGNOSIS — S02.2XXB OPEN FRACTURE OF NASAL BONE, INITIAL ENCOUNTER: ICD-10-CM

## 2020-10-30 PROCEDURE — 99214 OFFICE O/P EST MOD 30 MIN: CPT | Performed by: FAMILY MEDICINE

## 2020-10-30 RX ORDER — TRAMADOL HYDROCHLORIDE 50 MG/1
50 TABLET ORAL EVERY 6 HOURS PRN
Qty: 30 TABLET | Refills: 0 | Status: SHIPPED | OUTPATIENT
Start: 2020-10-30

## 2020-10-30 NOTE — PROGRESS NOTES
Subjective   Bryce Lyon is a 29 y.o. male.     He is in today for follow-up on a visit to the [place] emergency room earlier this month.  He had a syncopal episode.  He was walking with his girlfriend but apparently he just passed out and went face first into concrete sidewalk.  There was some discussion of possible seizure but nothing proven.  He has not had any further episodes.  Scans in the emergency room showed fractures to the nasal bones but no skull fractures.  He had no other issues found.  He is having a hard time wearing masks because of the nasal fractures.  He was given some pain medicine in the ER but he claims his roommate took it all.  He denies any chest pain or difficulty breathing.  He does mention some discomfort in the right shoulder, which she has had chronically but he thinks it is worse now.  He denies any issue with vision or hearing.  He denies having used any substances and denies having any alcohol in his system in the night in question.         /74 (BP Location: Right arm, Patient Position: Sitting, Cuff Size: Large Adult)   Pulse 103   Temp 97.7 °F (36.5 °C) (Temporal)   Wt 82.1 kg (181 lb)   SpO2 99%   BMI 29.21 kg/m²       Chief Complaint   Patient presents with   • Seizures     [facility] hospital f/u had a fall results in seizure           Current Outpatient Medications:   •  ALPRAZolam (XANAX) 0.5 MG tablet, TAKE 1 TABLET BY MOUTH TWICE A DAY AS NEEDED FOR ANXIETY, Disp: 60 tablet, Rfl: 1  •  gabapentin (NEURONTIN) 300 MG capsule, Take 2 capsules by mouth 3 (Three) Times a Day., Disp: 270 capsule, Rfl: 1  •  indomethacin (INDOCIN) 25 MG capsule, Take 2 capsules by mouth 3 (Three) Times a Day As Needed for Mild Pain ., Disp: 21 capsule, Rfl: 0  •  methocarbamol (ROBAXIN) 750 MG tablet, Take 750 mg by mouth Daily., Disp: , Rfl:   •  naproxen (EC NAPROSYN) 500 MG EC tablet, Take 1 tablet by mouth 2 (Two) Times a Day With Meals., Disp: 12 tablet, Rfl: 0  •  ibuprofen  (ADVIL,MOTRIN) 600 MG tablet, , Disp: , Rfl:   •  methylPREDNISolone (MEDROL, OLIVIER,) 4 MG tablet, Take as directed on package instructions., Disp: 1 each, Rfl: 0  •  traMADol (ULTRAM) 50 MG tablet, Take 1 tablet by mouth Every 6 (Six) Hours As Needed for Moderate Pain ., Disp: 30 tablet, Rfl: 0        The following portions of the patient's history were reviewed and updated as appropriate: allergies, current medications, past family history, past medical history, past social history, past surgical history and problem list.    Review of Systems   Constitutional: Negative for activity change, fatigue and fever.   HENT: Positive for congestion, facial swelling and sinus pain. Negative for sinus pressure, sore throat and trouble swallowing.    Eyes: Negative for visual disturbance.   Respiratory: Negative for chest tightness, shortness of breath and wheezing.    Cardiovascular: Negative for chest pain.   Gastrointestinal: Negative for abdominal distention, abdominal pain, constipation, diarrhea, nausea and vomiting.   Genitourinary: Negative for difficulty urinating and dysuria.   Musculoskeletal: Positive for arthralgias and myalgias. Negative for back pain and neck pain.   Psychiatric/Behavioral: Positive for sleep disturbance. Negative for agitation, hallucinations and suicidal ideas. The patient is nervous/anxious.        Objective   Physical Exam  Vitals signs and nursing note reviewed.   Constitutional:       Appearance: He is normal weight.   HENT:      Nose:      Comments: Swelling but no deformity over the nasal bones  Eyes:      Conjunctiva/sclera: Conjunctivae normal.      Pupils: Pupils are equal, round, and reactive to light.   Neck:      Musculoskeletal: Neck supple.   Cardiovascular:      Rate and Rhythm: Normal rate and regular rhythm.      Heart sounds: Normal heart sounds. No murmur.   Pulmonary:      Effort: Pulmonary effort is normal.      Breath sounds: Normal breath sounds. No wheezing or rales.    Abdominal:      General: Bowel sounds are normal.      Palpations: Abdomen is soft.      Tenderness: There is no abdominal tenderness. There is no guarding.   Musculoskeletal:         General: No swelling or deformity.      Comments: Some discomfort in the right shoulder   Lymphadenopathy:      Cervical: No cervical adenopathy.   Neurological:      General: No focal deficit present.      Mental Status: He is alert and oriented to person, place, and time. Mental status is at baseline.   Psychiatric:         Mood and Affect: Mood normal.           Assessment/Plan   Problems Addressed this Visit        Nervous and Auditory    Pain in right shoulder    Relevant Orders    Ambulatory Referral to Orthopedic Surgery       Other    Generalized anxiety disorder      Other Visit Diagnoses     Syncope, unspecified syncope type    -  Primary    Relevant Orders    MRI Brain With & Without Contrast    Ambulatory Referral to Neurology    Open fracture of nasal bone, initial encounter        Relevant Medications    traMADol (ULTRAM) 50 MG tablet    Other Relevant Orders    Ambulatory Referral to ENT (Otolaryngology)      Diagnoses       Codes Comments    Syncope, unspecified syncope type    -  Primary ICD-10-CM: R55  ICD-9-CM: 780.2     Open fracture of nasal bone, initial encounter     ICD-10-CM: S02.2XXB  ICD-9-CM: 802.1     Generalized anxiety disorder     ICD-10-CM: F41.1  ICD-9-CM: 300.02     Chronic right shoulder pain     ICD-10-CM: M25.511, G89.29  ICD-9-CM: 719.41, 338.29         I have no great explanation for the syncopal episode  I will refer him to neurology  I will attempt to get an MRI covered  I will refer him to ENT for the nasal fractures  He is cleared back to work and I did give him a more comfortable mask he could wear  He is to let us know if he has any further episodes  I did give him a small amount of tramadol for discomfort

## 2020-11-02 ENCOUNTER — TELEPHONE (OUTPATIENT)
Dept: FAMILY MEDICINE CLINIC | Facility: CLINIC | Age: 30
End: 2020-11-02

## 2020-11-02 NOTE — TELEPHONE ENCOUNTER
The tramadol is not working for his nose. The piece is still pushing down on his nose and it still hurts.

## 2020-11-06 ENCOUNTER — TELEPHONE (OUTPATIENT)
Dept: FAMILY MEDICINE CLINIC | Facility: CLINIC | Age: 30
End: 2020-11-06

## 2020-11-18 RX ORDER — GABAPENTIN 300 MG/1
600 CAPSULE ORAL 3 TIMES DAILY
Qty: 270 CAPSULE | Refills: 1 | Status: SHIPPED | OUTPATIENT
Start: 2020-11-18 | End: 2021-01-25

## 2020-11-19 ENCOUNTER — TELEPHONE (OUTPATIENT)
Dept: FAMILY MEDICINE CLINIC | Facility: CLINIC | Age: 30
End: 2020-11-19

## 2020-11-19 NOTE — TELEPHONE ENCOUNTER
Prior Auth for Alprazolam Tablet 0.5 mg was APPROVED on CoverMyMeds.    Quantity up to 60 tablets per 30 days, under the pharmacy benefit.

## 2020-11-30 DIAGNOSIS — F41.1 GENERALIZED ANXIETY DISORDER: ICD-10-CM

## 2020-11-30 RX ORDER — ALPRAZOLAM 0.5 MG/1
TABLET ORAL
Qty: 60 TABLET | Refills: 1 | Status: SHIPPED | OUTPATIENT
Start: 2020-11-30 | End: 2021-01-28

## 2021-01-25 RX ORDER — GABAPENTIN 300 MG/1
600 CAPSULE ORAL 3 TIMES DAILY
Qty: 270 CAPSULE | Refills: 1 | Status: SHIPPED | OUTPATIENT
Start: 2021-01-25 | End: 2021-03-08 | Stop reason: SDUPTHER

## 2021-01-28 DIAGNOSIS — F41.1 GENERALIZED ANXIETY DISORDER: ICD-10-CM

## 2021-01-28 RX ORDER — ALPRAZOLAM 0.5 MG/1
TABLET ORAL
Qty: 60 TABLET | Refills: 1 | Status: SHIPPED | OUTPATIENT
Start: 2021-01-28 | End: 2021-03-25

## 2021-03-08 RX ORDER — GABAPENTIN 300 MG/1
600 CAPSULE ORAL 3 TIMES DAILY
Qty: 270 CAPSULE | Refills: 1 | Status: SHIPPED | OUTPATIENT
Start: 2021-03-08

## 2021-03-11 ENCOUNTER — HOSPITAL ENCOUNTER (EMERGENCY)
Facility: HOSPITAL | Age: 31
Discharge: HOME | End: 2021-03-11
Admitting: EMERGENCY MEDICINE

## 2021-03-11 ENCOUNTER — APPOINTMENT (OUTPATIENT)
Dept: GENERAL RADIOLOGY | Facility: HOSPITAL | Age: 31
End: 2021-03-11

## 2021-03-11 VITALS
OXYGEN SATURATION: 98 % | RESPIRATION RATE: 15 BRPM | HEIGHT: 68 IN | BODY MASS INDEX: 30 KG/M2 | SYSTOLIC BLOOD PRESSURE: 128 MMHG | TEMPERATURE: 97.8 F | DIASTOLIC BLOOD PRESSURE: 98 MMHG | WEIGHT: 197.97 LBS | HEART RATE: 62 BPM

## 2021-03-11 DIAGNOSIS — M25.522 LEFT ELBOW PAIN: Primary | ICD-10-CM

## 2021-03-11 PROCEDURE — 96372 THER/PROPH/DIAG INJ SC/IM: CPT

## 2021-03-11 PROCEDURE — 99283 EMERGENCY DEPT VISIT LOW MDM: CPT

## 2021-03-11 PROCEDURE — 25010000002 METHYLPREDNISOLONE PER 125 MG: Performed by: NURSE PRACTITIONER

## 2021-03-11 PROCEDURE — 73070 X-RAY EXAM OF ELBOW: CPT

## 2021-03-11 RX ORDER — HYDROCODONE BITARTRATE AND ACETAMINOPHEN 5; 325 MG/1; MG/1
1 TABLET ORAL ONCE AS NEEDED
Status: COMPLETED | OUTPATIENT
Start: 2021-03-11 | End: 2021-03-11

## 2021-03-11 RX ORDER — METHYLPREDNISOLONE SODIUM SUCCINATE 125 MG/2ML
80 INJECTION, POWDER, LYOPHILIZED, FOR SOLUTION INTRAMUSCULAR; INTRAVENOUS ONCE
Status: COMPLETED | OUTPATIENT
Start: 2021-03-11 | End: 2021-03-11

## 2021-03-11 RX ADMIN — METHYLPREDNISOLONE SODIUM SUCCINATE 80 MG: 125 INJECTION, POWDER, FOR SOLUTION INTRAMUSCULAR; INTRAVENOUS at 12:17

## 2021-03-11 RX ADMIN — HYDROCODONE BITARTRATE AND ACETAMINOPHEN 1 TABLET: 5; 325 TABLET ORAL at 12:13

## 2021-03-11 NOTE — ED PROVIDER NOTES
Subjective   History: Patient is a 30-year-old male chronic left elbow pain.  Reports the last 2 days it has been locking up on him and has had decreased range of motion.  Pain is sharp, radiates distally to forearm.  Has attempted Aleve and ibuprofen without any relief.  Reports has been sore over the last couple of days but this a.m. he woke up and it was worse.  States he has a history of torn ligaments to the left elbow at one point surgery was discussed but never had surgery.  Last saw Ortho approximately 1 year and had an injection directly to the elbow.  Patient denies any specific injury at this time denies any heavy lifting except for laundry with with close in a basket.  No fever chills or nausea.  Reports slight tingling to stool portions index middle and ring finger to left hand.      Onset: 2 days  Location: Left elbow  Duration: Constant   Character: Sharp  Aggravating/Alleviating factors: Movement makes it worse  Radiation distally to forearm  Severity: Moderate            Review of Systems   Constitutional: Negative for chills, fatigue and fever.   HENT: Negative for congestion, sore throat, tinnitus and trouble swallowing.    Eyes: Negative for photophobia, discharge and visual disturbance.   Respiratory: Negative for cough and shortness of breath.    Cardiovascular: Negative for chest pain.   Gastrointestinal: Negative for abdominal pain, diarrhea, nausea and vomiting.   Genitourinary: Negative for dysuria, frequency and urgency.   Musculoskeletal: Positive for arthralgias and joint swelling. Negative for back pain.   Skin: Negative for rash.   Neurological: Negative for dizziness and headaches.   Psychiatric/Behavioral: Negative for confusion.       Past Medical History:   Diagnosis Date   • Arm pain        Allergies   Allergen Reactions   • Loratadine Shortness Of Breath       Past Surgical History:   Procedure Laterality Date   • DENTAL PROCEDURE     • TONSILLECTOMY         Family History  "  Problem Relation Age of Onset   • Diabetes Father        Social History     Socioeconomic History   • Marital status: Single     Spouse name: Not on file   • Number of children: Not on file   • Years of education: Not on file   • Highest education level: Not on file   Tobacco Use   • Smoking status: Current Every Day Smoker     Packs/day: 1.00   • Smokeless tobacco: Never Used   Substance and Sexual Activity   • Drug use: No   • Sexual activity: Defer           Objective   Physical Exam  Vitals reviewed.   Constitutional:       General: He is not in acute distress.     Appearance: Normal appearance. He is normal weight. He is not toxic-appearing.   HENT:      Head: Normocephalic and atraumatic.   Eyes:      Pupils: Pupils are equal, round, and reactive to light.   Cardiovascular:      Rate and Rhythm: Normal rate.      Pulses: Normal pulses.      Heart sounds: Normal heart sounds. No murmur.   Pulmonary:      Effort: Pulmonary effort is normal.   Musculoskeletal:         General: Swelling and tenderness present. No deformity or signs of injury.      Cervical back: Normal range of motion.      Comments: No surface trauma to left elbow no erythema, mild edema.  Tender to palpation lateral and medial aspect of elbow.  Limited range of motion due to discomfort and swelling.  Distal neurovascular sensation intact, cap refill less than 2 seconds.  2+ radial palpable pulse.  Radial ulnar medial nerve intact.   Skin:     General: Skin is warm and dry.      Findings: No rash.   Neurological:      Mental Status: He is alert and oriented to person, place, and time.   Psychiatric:         Mood and Affect: Mood normal.         Behavior: Behavior normal.         Thought Content: Thought content normal.         Judgment: Judgment normal.         Procedures           ED Course    /74 (BP Location: Right arm, Patient Position: Sitting)   Pulse 90   Temp 98.4 °F (36.9 °C) (Oral)   Resp 19   Ht 172.7 cm (68\")   Wt 89.8 kg " (197 lb 15.6 oz)   SpO2 100%   BMI 30.10 kg/m²   Labs Reviewed - No data to display  Medications   HYDROcodone-acetaminophen (NORCO) 5-325 MG per tablet 1 tablet (1 tablet Oral Given 3/11/21 1213)   methylPREDNISolone sodium succinate (SOLU-Medrol) injection 80 mg (80 mg Intramuscular Given 3/11/21 1217)     XR Elbow 2 View Left    Result Date: 3/11/2021   1. Multiple intra-articular bodies with soft tissue swelling at the ulnar aspect of the elbow. Could consider further evaluation with noncontrast CT or MRI if clinically indicated. 2. No radiographic evidence of acute fracture or dislocation. 3. Mild multifocal DJD.  Electronically Signed By-Camron Pierson MD On:3/11/2021 12:21 PM This report was finalized on [id number] by  Camron Pierson MD.                                           MDM     I examined the patient using the appropriate personal protective equipment.      DISPOSITION:   Chart Review:  Comorbidity:  has a past medical history of Arm pain.      Imaging: Was interpreted by physician and reviewed by myself:  XR Elbow 2 View Left    Result Date: 3/11/2021   1. Multiple intra-articular bodies with soft tissue swelling at the ulnar aspect of the elbow. Could consider further evaluation with noncontrast CT or MRI if clinically indicated. 2. No radiographic evidence of acute fracture or dislocation. 3. Mild multifocal DJD.  Electronically Signed By-Camron Pierson MD On:3/11/2021 12:21 PM This report was finalized on [id number] by  Camron Pierson MD.      Disposition/Treatment:    Given Solu-Medrol and Norco.  Patient has chronic left elbow pain after further review of his chart has been seen multiple times in the last couple years for same complaint.  At 1 point saw Dr. Concepcion discussed surgical intervention but patient did not have insurance at the time as he had a new job.   Today left elbow x-ray of the following1. Multiple intra-articular bodies with soft tissue swelling at the  ulnar aspect of the  elbow. Could consider further evaluation with  noncontrast CT or MRI if clinically indicated.  2. No radiographic evidence of acute fracture or dislocation.  3. Mild multifocal DJD.     The elbow does have mild edema and tender on palpation but is not erythematous or warm, appears nontoxic.   will give diclofenac on discharge and follow-up with Dr. Concepcion.    Scription: Diclofenac  Final diagnoses:   Left elbow pain            Mery Ho, APRN  03/11/21 1346

## 2021-03-11 NOTE — ED NOTES
Reports right arm pain and swelling the day before yesterday. No injury. States that one year ago he had an injection for torn ligaments.      Hermelinda Mejia RN  03/11/21 3498

## 2021-03-11 NOTE — DISCHARGE INSTRUCTIONS
Diclofenac as ordered.  Do not take Aleve ibuprofen or Motrin as they are in the same NSAID family.  Make sure to eat when taking the diclofenac as it may cause GI upset without food.  Follow-up with Dr. Concepcion.

## 2021-03-25 DIAGNOSIS — F41.1 GENERALIZED ANXIETY DISORDER: ICD-10-CM

## 2021-03-25 RX ORDER — ALPRAZOLAM 0.5 MG/1
TABLET ORAL
Qty: 60 TABLET | Refills: 1 | Status: SHIPPED | OUTPATIENT
Start: 2021-03-25

## 2021-04-15 ENCOUNTER — HOSPITAL ENCOUNTER (EMERGENCY)
Facility: HOSPITAL | Age: 31
Discharge: HOME | End: 2021-04-15
Admitting: EMERGENCY MEDICINE

## 2021-04-15 ENCOUNTER — APPOINTMENT (OUTPATIENT)
Dept: CT IMAGING | Facility: HOSPITAL | Age: 31
End: 2021-04-15

## 2021-04-15 VITALS
TEMPERATURE: 98.6 F | SYSTOLIC BLOOD PRESSURE: 125 MMHG | HEIGHT: 71 IN | RESPIRATION RATE: 17 BRPM | HEART RATE: 87 BPM | WEIGHT: 200 LBS | DIASTOLIC BLOOD PRESSURE: 66 MMHG | BODY MASS INDEX: 28 KG/M2 | OXYGEN SATURATION: 100 %

## 2021-04-15 DIAGNOSIS — R56.9 SEIZURE (HCC): Primary | ICD-10-CM

## 2021-04-15 LAB
ALBUMIN SERPL-MCNC: 4.3 G/DL (ref 3.5–5.2)
ALBUMIN/GLOB SERPL: 1.7 G/DL
ALP SERPL-CCNC: 92 U/L (ref 39–117)
ALT SERPL W P-5'-P-CCNC: 19 U/L (ref 1–41)
AMPHET+METHAMPHET UR QL: NEGATIVE
ANION GAP SERPL CALCULATED.3IONS-SCNC: 16 MMOL/L (ref 5–15)
AST SERPL-CCNC: 21 U/L (ref 1–40)
BARBITURATES UR QL SCN: NEGATIVE
BASOPHILS # BLD AUTO: 0.1 10*3/MM3 (ref 0–0.2)
BASOPHILS NFR BLD AUTO: 0.5 % (ref 0–1.5)
BENZODIAZ UR QL SCN: NEGATIVE
BILIRUB SERPL-MCNC: 0.2 MG/DL (ref 0–1.2)
BUN SERPL-MCNC: 8 MG/DL (ref 6–20)
BUN/CREAT SERPL: 8 (ref 7–25)
CALCIUM SPEC-SCNC: 9.2 MG/DL (ref 8.6–10.5)
CANNABINOIDS SERPL QL: NEGATIVE
CHLORIDE SERPL-SCNC: 105 MMOL/L (ref 98–107)
CO2 SERPL-SCNC: 20 MMOL/L (ref 22–29)
COCAINE UR QL: NEGATIVE
CREAT SERPL-MCNC: 1 MG/DL (ref 0.76–1.27)
DEPRECATED RDW RBC AUTO: 40.3 FL (ref 37–54)
EOSINOPHIL # BLD AUTO: 0.4 10*3/MM3 (ref 0–0.4)
EOSINOPHIL NFR BLD AUTO: 3.7 % (ref 0.3–6.2)
ERYTHROCYTE [DISTWIDTH] IN BLOOD BY AUTOMATED COUNT: 13.4 % (ref 12.3–15.4)
ETHANOL UR QL: <0.01 %
GFR SERPL CREATININE-BSD FRML MDRD: 88 ML/MIN/1.73
GLOBULIN UR ELPH-MCNC: 2.5 GM/DL
GLUCOSE BLDC GLUCOMTR-MCNC: 110 MG/DL (ref 70–105)
GLUCOSE SERPL-MCNC: 78 MG/DL (ref 65–99)
HCT VFR BLD AUTO: 42.3 % (ref 37.5–51)
HGB BLD-MCNC: 14.4 G/DL (ref 13–17.7)
HOLD SPECIMEN: NORMAL
HOLD SPECIMEN: NORMAL
LYMPHOCYTES # BLD AUTO: 3.3 10*3/MM3 (ref 0.7–3.1)
LYMPHOCYTES NFR BLD AUTO: 33.9 % (ref 19.6–45.3)
MCH RBC QN AUTO: 28.8 PG (ref 26.6–33)
MCHC RBC AUTO-ENTMCNC: 34 G/DL (ref 31.5–35.7)
MCV RBC AUTO: 84.8 FL (ref 79–97)
METHADONE UR QL SCN: NEGATIVE
MONOCYTES # BLD AUTO: 0.7 10*3/MM3 (ref 0.1–0.9)
MONOCYTES NFR BLD AUTO: 7.3 % (ref 5–12)
NEUTROPHILS NFR BLD AUTO: 5.4 10*3/MM3 (ref 1.7–7)
NEUTROPHILS NFR BLD AUTO: 54.6 % (ref 42.7–76)
NRBC BLD AUTO-RTO: 0.1 /100 WBC (ref 0–0.2)
OPIATES UR QL: POSITIVE
OXYCODONE UR QL SCN: NEGATIVE
PLATELET # BLD AUTO: 196 10*3/MM3 (ref 140–450)
PMV BLD AUTO: 8.8 FL (ref 6–12)
POTASSIUM SERPL-SCNC: 3.2 MMOL/L (ref 3.5–5.2)
PROT SERPL-MCNC: 6.8 G/DL (ref 6–8.5)
RBC # BLD AUTO: 4.99 10*6/MM3 (ref 4.14–5.8)
SODIUM SERPL-SCNC: 141 MMOL/L (ref 136–145)
TROPONIN T SERPL-MCNC: <0.01 NG/ML (ref 0–0.03)
TSH SERPL DL<=0.05 MIU/L-ACNC: 2 UIU/ML (ref 0.27–4.2)
WBC # BLD AUTO: 9.8 10*3/MM3 (ref 3.4–10.8)
WHOLE BLOOD HOLD SPECIMEN: NORMAL
WHOLE BLOOD HOLD SPECIMEN: NORMAL

## 2021-04-15 PROCEDURE — 96374 THER/PROPH/DIAG INJ IV PUSH: CPT

## 2021-04-15 PROCEDURE — 80307 DRUG TEST PRSMV CHEM ANLYZR: CPT | Performed by: PHYSICIAN ASSISTANT

## 2021-04-15 PROCEDURE — 96375 TX/PRO/DX INJ NEW DRUG ADDON: CPT

## 2021-04-15 PROCEDURE — 99283 EMERGENCY DEPT VISIT LOW MDM: CPT

## 2021-04-15 PROCEDURE — 93005 ELECTROCARDIOGRAM TRACING: CPT | Performed by: PHYSICIAN ASSISTANT

## 2021-04-15 PROCEDURE — 80053 COMPREHEN METABOLIC PANEL: CPT | Performed by: PHYSICIAN ASSISTANT

## 2021-04-15 PROCEDURE — 25010000002 LORAZEPAM PER 2 MG: Performed by: PHYSICIAN ASSISTANT

## 2021-04-15 PROCEDURE — 85025 COMPLETE CBC W/AUTO DIFF WBC: CPT | Performed by: PHYSICIAN ASSISTANT

## 2021-04-15 PROCEDURE — 84484 ASSAY OF TROPONIN QUANT: CPT | Performed by: PHYSICIAN ASSISTANT

## 2021-04-15 PROCEDURE — 25010000003 LEVETIRACETAM IN NACL 0.75% 1000 MG/100ML SOLUTION: Performed by: PHYSICIAN ASSISTANT

## 2021-04-15 PROCEDURE — 82077 ASSAY SPEC XCP UR&BREATH IA: CPT | Performed by: PHYSICIAN ASSISTANT

## 2021-04-15 PROCEDURE — 70450 CT HEAD/BRAIN W/O DYE: CPT

## 2021-04-15 PROCEDURE — 84443 ASSAY THYROID STIM HORMONE: CPT | Performed by: PHYSICIAN ASSISTANT

## 2021-04-15 PROCEDURE — 82962 GLUCOSE BLOOD TEST: CPT

## 2021-04-15 RX ORDER — LEVETIRACETAM 10 MG/ML
1000 INJECTION INTRAVASCULAR ONCE
Status: COMPLETED | OUTPATIENT
Start: 2021-04-15 | End: 2021-04-15

## 2021-04-15 RX ORDER — LORAZEPAM 2 MG/ML
0.5 INJECTION INTRAMUSCULAR ONCE
Status: COMPLETED | OUTPATIENT
Start: 2021-04-15 | End: 2021-04-15

## 2021-04-15 RX ORDER — SODIUM CHLORIDE 0.9 % (FLUSH) 0.9 %
10 SYRINGE (ML) INJECTION AS NEEDED
Status: DISCONTINUED | OUTPATIENT
Start: 2021-04-15 | End: 2021-04-15 | Stop reason: HOSPADM

## 2021-04-15 RX ORDER — LEVETIRACETAM 500 MG/1
500 TABLET ORAL 2 TIMES DAILY
Qty: 60 TABLET | Refills: 0 | Status: SHIPPED | OUTPATIENT
Start: 2021-04-15 | End: 2021-11-12 | Stop reason: SDUPTHER

## 2021-04-15 RX ADMIN — LORAZEPAM 0.5 MG: 2 INJECTION INTRAMUSCULAR; INTRAVENOUS at 01:16

## 2021-04-15 RX ADMIN — LEVETIRACETAM 1000 MG: 10 INJECTION INTRAVENOUS at 01:16

## 2021-04-15 NOTE — ED PROVIDER NOTES
Subjective   Chief Complaint: Seizure    Patient is a 30 year old  male with history of seizures who presents to the ER per EMS with chief complaint of seizure this evening.  Patient is poor historian, he active tremors and facial twitching but is awake and easily arousable.  Patient offers no complaints.  Patient's fiancé in the room states that he was taking the trash out when he started to feel funny, states that he laid down on the bed and then had a seizure for about 3 minutes.  Fiancé states patients versus take Keppra for seizures but has not taken this in a few weeks.  Patient denies any chest pain, shortness of breath headache dizziness or blurry vision.  He denies any abdominal pain, nausea vomiting or diarrhea.    PCP: Alexander Rayo      History provided by:  Patient      Review of Systems   Constitutional: Negative for chills and fever.   HENT: Negative for sore throat and trouble swallowing.    Eyes: Negative for visual disturbance.   Respiratory: Negative for shortness of breath and wheezing.    Cardiovascular: Negative for chest pain.   Gastrointestinal: Negative for abdominal pain, diarrhea, nausea and vomiting.   Genitourinary: Negative for dysuria.   Musculoskeletal: Negative for myalgias.   Skin: Negative for rash.   Neurological: Positive for seizures and light-headedness. Negative for headaches.   Psychiatric/Behavioral: Negative for behavioral problems.   All other systems reviewed and are negative.      Past Medical History:   Diagnosis Date   • Arm pain        Allergies   Allergen Reactions   • Loratadine Shortness Of Breath       Past Surgical History:   Procedure Laterality Date   • DENTAL PROCEDURE     • TONSILLECTOMY         Family History   Problem Relation Age of Onset   • Diabetes Father        Social History     Socioeconomic History   • Marital status: Single     Spouse name: Not on file   • Number of children: Not on file   • Years of education: Not on file   •  Highest education level: Not on file   Tobacco Use   • Smoking status: Current Every Day Smoker     Packs/day: 1.00   • Smokeless tobacco: Never Used   Substance and Sexual Activity   • Drug use: No   • Sexual activity: Defer           Objective   Physical Exam  Vitals and nursing note reviewed.   Constitutional:       General: He is not in acute distress.     Appearance: Normal appearance. He is normal weight. He is not diaphoretic.   HENT:      Head: Normocephalic and atraumatic.      Right Ear: Tympanic membrane normal.      Left Ear: Tympanic membrane and ear canal normal.      Nose: Nose normal. No congestion.      Mouth/Throat:      Mouth: Mucous membranes are moist.   Eyes:      General: No visual field deficit.     Extraocular Movements: Extraocular movements intact.      Pupils: Pupils are equal, round, and reactive to light.   Cardiovascular:      Rate and Rhythm: Normal rate and regular rhythm.      Pulses: Normal pulses.      Heart sounds: Normal heart sounds. No murmur heard.     Pulmonary:      Effort: Pulmonary effort is normal.      Breath sounds: Normal breath sounds.   Abdominal:      General: Abdomen is flat.      Tenderness: There is no abdominal tenderness.   Musculoskeletal:         General: No tenderness. Normal range of motion.      Cervical back: Normal range of motion.   Skin:     General: Skin is warm.      Capillary Refill: Capillary refill takes less than 2 seconds.      Findings: No erythema.   Neurological:      General: No focal deficit present.      Mental Status: He is alert.      Cranial Nerves: No cranial nerve deficit or facial asymmetry.      Sensory: Sensation is intact. No sensory deficit.      Motor: Motor function is intact.      Coordination: Coordination is intact.      Comments: Tremors, facial twitching, patient is easily arousable with verbal stimulus and will follow commands   Psychiatric:         Mood and Affect: Mood normal.         Behavior: Behavior normal.  "        Procedures           ED Course  ED Course as of Apr 15 0235   Thu Apr 15, 2021   0158 Patient reevaluated, no complaints at this time.  Patient resting in bed without any discomfort    [MM]   0217 Patient has no complaints. No longer has facial twitching or tremors. Will discharge    [MM]   0223 Spoke with [facility] pharmacy tech, patient takes Keppra 500 mg twice daily, apparently has not filled this since November 2020    [MM]      ED Course User Index  [MM] Ely Hager PA    /66   Pulse 87   Temp 98.4 °F (36.9 °C) (Oral)   Resp 17   Ht 180.3 cm (71\")   Wt 90.7 kg (200 lb)   SpO2 100%   BMI 27.89 kg/m²   Labs Reviewed   COMPREHENSIVE METABOLIC PANEL - Abnormal; Notable for the following components:       Result Value    Potassium 3.2 (*)     CO2 20.0 (*)     Anion Gap 16.0 (*)     All other components within normal limits    Narrative:     GFR Normal >60  Chronic Kidney Disease <60  Kidney Failure <15     URINE DRUG SCREEN - Abnormal; Notable for the following components:    Opiate Screen Positive (*)     All other components within normal limits    Narrative:     Negative Thresholds Per Drugs Screened:    Amphetamines                 500 ng/ml  Barbiturates                 200 ng/ml  Benzodiazepines              100 ng/ml  Cocaine                      300 ng/ml  Methadone                    300 ng/ml  Opiates                      300 ng/ml  Oxycodone                    100 ng/ml  THC                           50 ng/ml    The Normal Value for all drugs tested is negative. This report includes final unconfirmed screening results to be used for medical treatment purposes only. Unconfirmed results must not be used for non-medical purposes such as employment or legal testing. Clinical consideration should be applied to any drug of abuse test, particularly when unconfirmed results are used.          All urine drugs of abuse requests without chain of custody are for medical screening purposes " only.  False positives are possible.     CBC WITH AUTO DIFFERENTIAL - Abnormal; Notable for the following components:    Lymphocytes, Absolute 3.30 (*)     All other components within normal limits   POCT GLUCOSE FINGERSTICK - Abnormal; Notable for the following components:    Glucose 110 (*)     All other components within normal limits   TROPONIN (IN-HOUSE) - Normal    Narrative:     Troponin T Reference Range:  <= 0.03 ng/mL-   Negative for AMI  >0.03 ng/mL-     Abnormal for myocardial necrosis.  Clinicians would have to utilize clinical acumen, EKG, Troponin and serial changes to determine if it is an Acute Myocardial Infarction or myocardial injury due to an underlying chronic condition.       Results may be falsely decreased if patient taking Biotin.     TSH - Normal   RAINBOW DRAW    Narrative:     The following orders were created for panel order [place] Draw.  Procedure                               Abnormality         Status                     ---------                               -----------         ------                     Light Blue Top[[id number]]                                   Final result               Green Top (Gel)[[id number]]                                  Final result               Lavender Top[[id number]]                                     Final result               Gold Top - SST[[id number]]                                   Final result                 Please view results for these tests on the individual orders.   ETHANOL    Narrative:     Plasma Ethanol Clinical Symptoms:    ETOH (%)               Clinical Symptom  .01-.05              No apparent influence  .03-.12              Euphoria, Diminished judgment and attention   .09-.25              Impaired comprehension, Muscle incoordination  .18-.30              Confusion, Staggered gait, Slurred speech  .25-.40              Markedly decreased response to stimuli, unable to stand or                        walk, vomitting, sleep or  stupor  .35-.50              Comatose, Anesthesia, Subnormal body temperature       POCT GLUCOSE FINGERSTICK   LIGHT BLUE TOP   GREEN TOP   LAVENDER TOP   GOLD TOP - SST   CBC AND DIFFERENTIAL    Narrative:     The following orders were created for panel order CBC & Differential.  Procedure                               Abnormality         Status                     ---------                               -----------         ------                     CBC Auto Differential[[id number]]        Abnormal            Final result                 Please view results for these tests on the individual orders.     Medications   sodium chloride 0.9 % flush 10 mL (has no administration in time range)   sodium chloride 0.9 % flush 10 mL (has no administration in time range)   LORazepam (ATIVAN) injection 0.5 mg (0.5 mg Intravenous Given 4/15/21 0116)   levETIRAcetam in NaCl 0.75% (KEPPRA) IVPB 1,000 mg (0 mg Intravenous Stopped 4/15/21 0131)     No radiology results for the last day    Final result by Morro Law MD (04/15/21 01:42:13)                Impression:    1. No acute intracranial process. Nonemergent outpatient MRI can best assess for any seizure focus. Paranasal sinus disease.           Electronically signed by:  Morro Law M.D.    4/14/2021 11:42 PM                                                   MDM  Number of Diagnoses or Management Options  Seizure (CMS/[facility])  Diagnosis management comments: MEDICAL DECISION  Epic Chart Review:   Comorbidities: Illicit drug use, seizures  Differentials: Seizure, rectal abnormality, dysrhythmia; this list is not all inclusive and does not constitute the entirety of considered causes  Radiology interpretation:  Images reviewed by me and interpreted by radiologist,   CT head shows no acute intracranial abnormality  Lab interpretation:  Labs viewed by me significant for, urine drug screen positive for opiates.  POC glucose 110.  CMP potassium 3.2, CO2 20.0.  Ethanol normal.   CBC normal.  Troponin normal less than 0.01.  TSH normal.  EKG interpretation: Reviewed by myself interpreted by Dr. James, sinus rhythm with a rate of 86 with no acute ST changes, no previous to compare    While in the ED IV was placed and labs were obtained appropriate PPE was worn during exam and throughout all encounters with the patient.  Patient had the above evaluation.  Physical exam relatively benign, patient appears to have facial twitching, tremors, however when I say his name or ask him to follow command he immediately does this and there is no longer a tremor.  Patient given 0.5 mg Ativan and 1 g Keppra.  EKG shows normal sinus rhythm rate 86 with no acute ST changes.  Lab work unremarkable.  Urine drug screen positive for opiates.  CT head shows no acute intracranial abnormality.  Per patient and fiancé patient is noncompliant with his seizure medication, Keppra.  Discussed importance of taking his medication as prescribed to prevent seizures, patient verbalized understanding.  On reevaluation patient's tremors seem to have subsided, he has no further complaints.  Patient had no seizure activity while in the ER.  Confirmed with ER pharmacy tech patient's Keppra dose, Keppra 500 mg twice daily, this was refilled for the patient and sent to his pharmacy.    Discharge plan and instructions were discussed with the patient who verbalized understanding and is in agreement with the plan, all questions were answered at this time.  Patient is aware of signs symptoms that would require immediate return to the emergency room.  Patient understands importance of following up with primary care provider for further evaluation and worsening concerns as well as blood pressure recheck in the next 4 weeks.    Patient remained afebrile, nontoxic-appearing, no acute respiratory distress throughout entire emergency room stay.  Patient was discharged in improved stable condition with an upright steady gait.         Amount  and/or Complexity of Data Reviewed  Clinical lab tests: ordered and reviewed  Tests in the radiology section of CPT®: ordered and reviewed    Patient Progress  Patient progress: stable      Final diagnoses:   Seizure (CMS/HCC)       ED Disposition  ED Disposition     ED Disposition Condition Comment    Discharge Stable           No follow-up provider specified.       Medication List      No changes were made to your prescriptions during this visit.          Ely Hager PA  04/15/21 0237

## 2021-04-15 NOTE — DISCHARGE INSTRUCTIONS
Make sure to take your Keppra as prescribed  Drink plenty of water    Follow-up with primary care in 2 weeks  Follow-up with neurologist as needed    Return to the ER for new or worsening symptoms

## 2021-04-16 LAB — QT INTERVAL: 357 MS

## 2021-06-02 ENCOUNTER — TELEPHONE (OUTPATIENT)
Dept: FAMILY MEDICINE CLINIC | Facility: CLINIC | Age: 31
End: 2021-06-02

## 2021-06-02 NOTE — TELEPHONE ENCOUNTER
Pt has started at a treatment center and needs a letter from you simply stating you no longer prescribe him Xanax.  We recently d/c for no-shows, so I will add the dismissal in once the letter has been done.

## 2021-06-03 NOTE — TELEPHONE ENCOUNTER
Letter is printed and ready to go   Patient ambulates to restroom with steady gait, no acute distress noted.

## 2021-07-17 ENCOUNTER — APPOINTMENT (OUTPATIENT)
Dept: GENERAL RADIOLOGY | Facility: HOSPITAL | Age: 31
End: 2021-07-17

## 2021-07-17 ENCOUNTER — HOSPITAL ENCOUNTER (EMERGENCY)
Facility: HOSPITAL | Age: 31
Discharge: HOME | End: 2021-07-17
Attending: EMERGENCY MEDICINE | Admitting: EMERGENCY MEDICINE

## 2021-07-17 VITALS
RESPIRATION RATE: 16 BRPM | OXYGEN SATURATION: 100 % | HEART RATE: 76 BPM | SYSTOLIC BLOOD PRESSURE: 137 MMHG | HEIGHT: 68 IN | WEIGHT: 195.33 LBS | DIASTOLIC BLOOD PRESSURE: 66 MMHG | TEMPERATURE: 98 F | BODY MASS INDEX: 29.6 KG/M2

## 2021-07-17 DIAGNOSIS — J98.01 BRONCHOSPASM: ICD-10-CM

## 2021-07-17 DIAGNOSIS — J06.9 VIRAL UPPER RESPIRATORY INFECTION: Primary | ICD-10-CM

## 2021-07-17 DIAGNOSIS — Z20.822 COVID-19 RULED OUT BY LABORATORY TESTING: ICD-10-CM

## 2021-07-17 LAB — SARS-COV-2 RNA PNL SPEC NAA+PROBE: NOT DETECTED

## 2021-07-17 PROCEDURE — 71045 X-RAY EXAM CHEST 1 VIEW: CPT

## 2021-07-17 PROCEDURE — 94640 AIRWAY INHALATION TREATMENT: CPT

## 2021-07-17 PROCEDURE — 99283 EMERGENCY DEPT VISIT LOW MDM: CPT

## 2021-07-17 PROCEDURE — U0003 INFECTIOUS AGENT DETECTION BY NUCLEIC ACID (DNA OR RNA); SEVERE ACUTE RESPIRATORY SYNDROME CORONAVIRUS 2 (SARS-COV-2) (CORONAVIRUS DISEASE [COVID-19]), AMPLIFIED PROBE TECHNIQUE, MAKING USE OF HIGH THROUGHPUT TECHNOLOGIES AS DESCRIBED BY CMS-2020-01-R: HCPCS | Performed by: EMERGENCY MEDICINE

## 2021-07-17 RX ORDER — ALBUTEROL SULFATE 90 UG/1
2 AEROSOL, METERED RESPIRATORY (INHALATION) ONCE
Status: COMPLETED | OUTPATIENT
Start: 2021-07-17 | End: 2021-07-17

## 2021-07-17 RX ORDER — PREDNISONE 50 MG/1
50 TABLET ORAL DAILY
Qty: 4 TABLET | Refills: 0 | Status: SHIPPED | OUTPATIENT
Start: 2021-07-17 | End: 2022-03-15

## 2021-07-17 RX ORDER — ALBUTEROL SULFATE 90 UG/1
2 AEROSOL, METERED RESPIRATORY (INHALATION) EVERY 4 HOURS PRN
Qty: 8 G | Refills: 0 | Status: SHIPPED | OUTPATIENT
Start: 2021-07-17

## 2021-07-17 RX ADMIN — ALBUTEROL SULFATE 2 PUFF: 108 INHALANT RESPIRATORY (INHALATION) at 15:43

## 2021-07-17 NOTE — ED NOTES
Patient reports cough and congestion for 3 days.  No known exposure to Covid.      Arline Parry RN  07/17/21 0390

## 2021-07-17 NOTE — ED PROVIDER NOTES
Subjective   History of Present Illness  Cough  30-year-old male complains of productive cough over the last 3 days with wheezing and some shortness of breath.  He reports no fevers or chills he does report nasal congestion.  He denies any known ill contacts.  He has some sharp chest discomfort when he coughs.  He reports no hemoptysis  Review of Systems   Constitutional: Negative for fever.   HENT: Positive for congestion.    Eyes: Negative.    Respiratory: Positive for cough, shortness of breath and wheezing.    Cardiovascular: Positive for chest pain.   Gastrointestinal: Negative.    Genitourinary: Negative.    Musculoskeletal: Negative.    Skin: Negative.    Neurological: Negative.    Psychiatric/Behavioral: Negative.        Past Medical History:   Diagnosis Date   • Arm pain        Allergies   Allergen Reactions   • Loratadine Shortness Of Breath       Past Surgical History:   Procedure Laterality Date   • DENTAL PROCEDURE     • TONSILLECTOMY         Family History   Problem Relation Age of Onset   • Diabetes Father        Social History     Socioeconomic History   • Marital status: Single     Spouse name: Not on file   • Number of children: Not on file   • Years of education: Not on file   • Highest education level: Not on file   Tobacco Use   • Smoking status: Current Every Day Smoker     Packs/day: 1.00   • Smokeless tobacco: Never Used   Substance and Sexual Activity   • Drug use: No   • Sexual activity: Defer       Prior to Admission medications    Medication Sig Start Date End Date Taking? Authorizing Provider   ALPRAZolam (XANAX) 0.5 MG tablet TAKE 1 TABLET BY MOUTH TWICE A DAY AS NEEDED FOR ANXIETY 3/25/21   Alexander Rayo MD   diclofenac (VOLTAREN) 50 MG EC tablet Take 1 tablet by mouth 3 (Three) Times a Day. 3/11/21   Mery Ho APRN   gabapentin (NEURONTIN) 300 MG capsule Take 2 capsules by mouth 3 (Three) Times a Day. 3/8/21   Alexander Rayo MD   indomethacin (INDOCIN) 25  "MG capsule Take 2 capsules by mouth 3 (Three) Times a Day As Needed for Mild Pain . 9/11/19   Connor Castillo APRN   levETIRAcetam (KEPPRA) 500 MG tablet Take 1 tablet by mouth 2 (Two) Times a Day for 30 days. 4/15/21 5/15/21  Ely Hager PA   methocarbamol (ROBAXIN) 750 MG tablet Take 750 mg by mouth Daily. 11/25/19   Provider, MD Lilo   methylPREDNISolone (MEDROL, OLIVIER,) 4 MG tablet Take as directed on package instructions. 8/30/19   Felipe Zapien MD   traMADol (ULTRAM) 50 MG tablet Take 1 tablet by mouth Every 6 (Six) Hours As Needed for Moderate Pain . 10/30/20   Alexander Raoy MD     /62 (BP Location: Left arm, Patient Position: Sitting)   Pulse 67   Temp 98.1 °F (36.7 °C) (Oral)   Resp 18   Ht 172.7 cm (68\")   Wt 88.6 kg (195 lb 5.2 oz)   SpO2 95%   BMI 29.70 kg/m²   I examined the patient using the appropriate personal protective equipment.        Objective   Physical Exam  General: Well-developed well-appearing, no acute distress, alert and appropriate  Eyes:  sclera nonicteric  HEENT: Mucous membranes moist, no mucosal swelling, audible nasal congestion  Neck: Supple, no nuchal rigidity, no lymphadenopathy  Respirations: Occasional raspy cough, bilateral coarse wheeze, respirations nonlabored  Heart regular rate and rhythm, no murmurs rubs or gallops,   Abdomen soft nontender nondistended, no hepatosplenomegaly,   Extremities no clubbing cyanosis or edema, calves are symmetric and nontender  Neuro cranial nerves grossly intact, no focal limb deficits  Psych oriented, pleasant affect  Skin no rash, brisk cap refill  Procedures           ED Course      XR Chest 1 View    Result Date: 7/17/2021  No radiographic findings of acute cardiopulmonary abnormality.  Electronically Signed By-Rajendra Stewart MD On:7/17/2021 3:39 PM This report was finalized on [id number] by  Rajendra Stewart MD.    Results for orders placed or performed during the hospital encounter of 07/17/21 "   COVID-19,CEPHEID,COR/STACI/PAD/SHIMON IN-HOUSE(OR EMERGENT/ADD-ON),NP SWAB IN TRANSPORT MEDIA 3-4 HR TAT, RT-PCR - Swab, Nasopharynx    Specimen: Nasopharynx; Swab   Result Value Ref Range    COVID19 Not Detected Not Detected - Ref. Range                                          MDM  Patient was advised the findings he was ordered albuterol and was feeling much better on reexamination.  Lungs are essentially clear his respirations are nonlabored.  Chest ray shows no acute infiltrates and his Covid screen was negative.  Symptoms are suggestive of viral respiratory infection with some bronchospasm.  He was prescribed prednisone and albuterol.  He is advised stop smoking.  He is given warning signs for return and discharged in good condition  Final diagnoses:   Viral upper respiratory infection   Bronchospasm       ED Disposition  ED Disposition     ED Disposition Condition Comment    Discharge Stable           [facility] - [place] [address]  [phone]  Schedule an appointment as soon as possible for a visit in 1 week           Medication List      New Prescriptions    albuterol sulfate  (90 Base) MCG/ACT inhaler  Commonly known as: PROVENTIL HFA;VENTOLIN HFA;PROAIR HFA  Inhale 2 puffs Every 4 (Four) Hours As Needed for Wheezing.     predniSONE 50 MG tablet  Commonly known as: DELTASONE  Take 1 tablet by mouth Daily.           Where to Get Your Medications      These medications were sent to [facility]/pharmacy #[id number] - [facility] [place] - [address] [place] [address] [phone] [place] [id number] FX  [address] [facility] [place] [address]    Hours: 24-hours Phone: [phone]   · albuterol sulfate  (90 Base) MCG/ACT inhaler  · predniSONE 50 MG tablet          Lucian Marie MD  07/17/21 6974

## 2021-08-15 ENCOUNTER — HOSPITAL ENCOUNTER (EMERGENCY)
Facility: HOSPITAL | Age: 31
Discharge: LEFT BEFORE DISCHARGE | End: 2021-08-16

## 2021-08-15 VITALS
HEART RATE: 63 BPM | SYSTOLIC BLOOD PRESSURE: 122 MMHG | TEMPERATURE: 98 F | BODY MASS INDEX: 30.24 KG/M2 | DIASTOLIC BLOOD PRESSURE: 78 MMHG | RESPIRATION RATE: 16 BRPM | WEIGHT: 199.52 LBS | HEIGHT: 68 IN | OXYGEN SATURATION: 97 %

## 2021-08-15 PROCEDURE — 99211 OFF/OP EST MAY X REQ PHY/QHP: CPT

## 2021-11-12 ENCOUNTER — HOSPITAL ENCOUNTER (EMERGENCY)
Facility: HOSPITAL | Age: 31
Discharge: HOME | End: 2021-11-12
Attending: EMERGENCY MEDICINE | Admitting: EMERGENCY MEDICINE

## 2021-11-12 VITALS
OXYGEN SATURATION: 99 % | DIASTOLIC BLOOD PRESSURE: 63 MMHG | SYSTOLIC BLOOD PRESSURE: 114 MMHG | RESPIRATION RATE: 15 BRPM | HEART RATE: 80 BPM | WEIGHT: 220 LBS | BODY MASS INDEX: 33.34 KG/M2 | TEMPERATURE: 98 F | HEIGHT: 68 IN

## 2021-11-12 DIAGNOSIS — R56.9 SEIZURE (HCC): Primary | ICD-10-CM

## 2021-11-12 LAB
ALBUMIN SERPL-MCNC: 4.4 G/DL (ref 3.5–5.2)
ALBUMIN/GLOB SERPL: 1.8 G/DL
ALP SERPL-CCNC: 106 U/L (ref 39–117)
ALT SERPL W P-5'-P-CCNC: 21 U/L (ref 1–41)
ANION GAP SERPL CALCULATED.3IONS-SCNC: 32 MMOL/L (ref 5–15)
AST SERPL-CCNC: 26 U/L (ref 1–40)
BASOPHILS # BLD AUTO: 0.1 10*3/MM3 (ref 0–0.2)
BASOPHILS NFR BLD AUTO: 0.7 % (ref 0–1.5)
BILIRUB SERPL-MCNC: 0.2 MG/DL (ref 0–1.2)
BUN SERPL-MCNC: 10 MG/DL (ref 6–20)
BUN/CREAT SERPL: 8.4 (ref 7–25)
CALCIUM SPEC-SCNC: 9.3 MG/DL (ref 8.6–10.5)
CHLORIDE SERPL-SCNC: 101 MMOL/L (ref 98–107)
CK SERPL-CCNC: 197 U/L (ref 20–200)
CO2 SERPL-SCNC: 12 MMOL/L (ref 22–29)
CREAT SERPL-MCNC: 1.19 MG/DL (ref 0.76–1.27)
DEPRECATED RDW RBC AUTO: 44.2 FL (ref 37–54)
EOSINOPHIL # BLD AUTO: 0.5 10*3/MM3 (ref 0–0.4)
EOSINOPHIL NFR BLD AUTO: 3.2 % (ref 0.3–6.2)
ERYTHROCYTE [DISTWIDTH] IN BLOOD BY AUTOMATED COUNT: 14.6 % (ref 12.3–15.4)
ETHANOL UR QL: <0.01 %
GFR SERPL CREATININE-BSD FRML MDRD: 72 ML/MIN/1.73
GLOBULIN UR ELPH-MCNC: 2.5 GM/DL
GLUCOSE SERPL-MCNC: 130 MG/DL (ref 65–99)
HCT VFR BLD AUTO: 44.2 % (ref 37.5–51)
HGB BLD-MCNC: 14.8 G/DL (ref 13–17.7)
HOLD SPECIMEN: NORMAL
LYMPHOCYTES # BLD AUTO: 5.9 10*3/MM3 (ref 0.7–3.1)
LYMPHOCYTES NFR BLD AUTO: 41.1 % (ref 19.6–45.3)
MCH RBC QN AUTO: 28.2 PG (ref 26.6–33)
MCHC RBC AUTO-ENTMCNC: 33.4 G/DL (ref 31.5–35.7)
MCV RBC AUTO: 84.5 FL (ref 79–97)
MONOCYTES # BLD AUTO: 1.1 10*3/MM3 (ref 0.1–0.9)
MONOCYTES NFR BLD AUTO: 7.8 % (ref 5–12)
NEUTROPHILS NFR BLD AUTO: 47.2 % (ref 42.7–76)
NEUTROPHILS NFR BLD AUTO: 6.8 10*3/MM3 (ref 1.7–7)
NRBC BLD AUTO-RTO: 0.3 /100 WBC (ref 0–0.2)
PLATELET # BLD AUTO: 255 10*3/MM3 (ref 140–450)
PMV BLD AUTO: 8.8 FL (ref 6–12)
POTASSIUM SERPL-SCNC: 3.2 MMOL/L (ref 3.5–5.2)
PROT SERPL-MCNC: 6.9 G/DL (ref 6–8.5)
RBC # BLD AUTO: 5.22 10*6/MM3 (ref 4.14–5.8)
SODIUM SERPL-SCNC: 145 MMOL/L (ref 136–145)
WBC # BLD AUTO: 14.3 10*3/MM3 (ref 3.4–10.8)
WHOLE BLOOD HOLD SPECIMEN: NORMAL

## 2021-11-12 PROCEDURE — 0 LEVETIRACETAM IN NACL 0.75% 1000 MG/100ML SOLUTION: Performed by: EMERGENCY MEDICINE

## 2021-11-12 PROCEDURE — 82077 ASSAY SPEC XCP UR&BREATH IA: CPT | Performed by: EMERGENCY MEDICINE

## 2021-11-12 PROCEDURE — 82550 ASSAY OF CK (CPK): CPT | Performed by: EMERGENCY MEDICINE

## 2021-11-12 PROCEDURE — 85025 COMPLETE CBC W/AUTO DIFF WBC: CPT | Performed by: EMERGENCY MEDICINE

## 2021-11-12 PROCEDURE — 99285 EMERGENCY DEPT VISIT HI MDM: CPT

## 2021-11-12 PROCEDURE — 80053 COMPREHEN METABOLIC PANEL: CPT | Performed by: EMERGENCY MEDICINE

## 2021-11-12 PROCEDURE — 96375 TX/PRO/DX INJ NEW DRUG ADDON: CPT

## 2021-11-12 PROCEDURE — 96374 THER/PROPH/DIAG INJ IV PUSH: CPT

## 2021-11-12 PROCEDURE — 99284 EMERGENCY DEPT VISIT MOD MDM: CPT

## 2021-11-12 PROCEDURE — 25010000002 LORAZEPAM PER 2 MG: Performed by: EMERGENCY MEDICINE

## 2021-11-12 RX ORDER — LORAZEPAM 2 MG/ML
1 INJECTION INTRAMUSCULAR ONCE
Status: COMPLETED | OUTPATIENT
Start: 2021-11-12 | End: 2021-11-12

## 2021-11-12 RX ORDER — LEVETIRACETAM 500 MG/1
500 TABLET ORAL 2 TIMES DAILY
Qty: 60 TABLET | Refills: 0 | Status: SHIPPED | OUTPATIENT
Start: 2021-11-12 | End: 2022-07-25 | Stop reason: SDUPTHER

## 2021-11-12 RX ORDER — SODIUM CHLORIDE 0.9 % (FLUSH) 0.9 %
10 SYRINGE (ML) INJECTION AS NEEDED
Status: DISCONTINUED | OUTPATIENT
Start: 2021-11-12 | End: 2021-11-12 | Stop reason: HOSPADM

## 2021-11-12 RX ORDER — LEVETIRACETAM 10 MG/ML
1000 INJECTION INTRAVASCULAR ONCE
Status: COMPLETED | OUTPATIENT
Start: 2021-11-12 | End: 2021-11-12

## 2021-11-12 RX ADMIN — LEVETIRACETAM 1000 MG: 1000 INJECTION, SOLUTION INTRAVENOUS at 01:35

## 2021-11-12 RX ADMIN — LORAZEPAM 1 MG: 2 INJECTION INTRAMUSCULAR; INTRAVENOUS at 01:35

## 2021-11-12 NOTE — ED NOTES
This RN is the charge nurse.  Face to face assessment for behavioral restraints performed.  Patient aggressive and trying to harm staff.  Restraints applied.  Unable to distract or redirect patient.  Security at bedside.     Arline Carmichael, RN  11/12/21 0159

## 2021-11-12 NOTE — ED NOTES
Patient A&Ox3 now, pleasant and cooperative, does not remember any of tonight's events. Tolerating being out of restraints, states he has not been taking his keppra but does not remember taking gabapentin      Kathie Riggins RN  11/12/21 8415

## 2021-11-12 NOTE — ED PROVIDER NOTES
"Subjective   Chief complaint: Seizure    31-year-old male presents by EMS after a seizure.  EMS report patient had a 2-minute seizure witnessed by his girlfriend.  EMS states patient has been combative throughout transport.  They have seen no seizure activity.  Patient's girlfriend also reported that the patient may have taken some extra gabapentin tonight.  Apparently this is something he does occasionally for recreational use.  There was no report of suicidal ideation.  No additional history can be obtained at this time.      History provided by:  EMS personnel      Review of Systems   Unable to perform ROS: Mental status change   Neurological: Positive for seizures.   Psychiatric/Behavioral: Positive for behavioral problems.       Past Medical History:   Diagnosis Date   • Arm pain        No Known Allergies    Past Surgical History:   Procedure Laterality Date   • DENTAL PROCEDURE     • TONSILLECTOMY         Family History   Problem Relation Age of Onset   • Diabetes Father        Social History     Socioeconomic History   • Marital status: Single   Tobacco Use   • Smoking status: Current Every Day Smoker     Packs/day: 1.00   • Smokeless tobacco: Never Used   Substance and Sexual Activity   • Drug use: No   • Sexual activity: Defer       /58   Pulse 86   Temp 98.3 °F (36.8 °C) (Oral)   Resp 15   Ht 172.7 cm (68\")   Wt 99.8 kg (220 lb)   SpO2 100%   BMI 33.45 kg/m²       Objective   Physical Exam  Vitals and nursing note reviewed.   Constitutional:       Appearance: He is well-developed.      Comments: Patient is awake and combative.  He will say his name but would not answer any other questions.   HENT:      Head: Normocephalic and atraumatic.   Eyes:      Pupils: Pupils are equal, round, and reactive to light.   Cardiovascular:      Rate and Rhythm: Normal rate and regular rhythm.      Heart sounds: Normal heart sounds.   Pulmonary:      Effort: Pulmonary effort is normal. No respiratory distress. "      Breath sounds: Normal breath sounds.   Abdominal:      General: Bowel sounds are normal.      Palpations: Abdomen is soft.      Tenderness: There is no abdominal tenderness.   Skin:     General: Skin is warm and dry.   Neurological:      General: No focal deficit present.      Comments: Oriented to person only at this time.  Good strength in all extremities.         Procedures           ED Course      Results for orders placed or performed during the hospital encounter of 11/12/21   Comprehensive Metabolic Panel    Specimen: Blood   Result Value Ref Range    Glucose 130 (H) 65 - 99 mg/dL    BUN 10 6 - 20 mg/dL    Creatinine 1.19 0.76 - 1.27 mg/dL    Sodium 145 136 - 145 mmol/L    Potassium 3.2 (L) 3.5 - 5.2 mmol/L    Chloride 101 98 - 107 mmol/L    CO2 12.0 (L) 22.0 - 29.0 mmol/L    Calcium 9.3 8.6 - 10.5 mg/dL    Total Protein 6.9 6.0 - 8.5 g/dL    Albumin 4.40 3.50 - 5.20 g/dL    ALT (SGPT) 21 1 - 41 U/L    AST (SGOT) 26 1 - 40 U/L    Alkaline Phosphatase 106 39 - 117 U/L    Total Bilirubin 0.2 0.0 - 1.2 mg/dL    eGFR Non African Amer 72 >60 mL/min/1.73    Globulin 2.5 gm/dL    A/G Ratio 1.8 g/dL    BUN/Creatinine Ratio 8.4 7.0 - 25.0    Anion Gap 32.0 (H) 5.0 - 15.0 mmol/L   CK    Specimen: Blood   Result Value Ref Range    Creatine Kinase 197 20 - 200 U/L   Ethanol    Specimen: Blood   Result Value Ref Range    Ethanol % <0.010 %   CBC Auto Differential    Specimen: Blood   Result Value Ref Range    WBC 14.30 (H) 3.40 - 10.80 10*3/mm3    RBC 5.22 4.14 - 5.80 10*6/mm3    Hemoglobin 14.8 13.0 - 17.7 g/dL    Hematocrit 44.2 37.5 - 51.0 %    MCV 84.5 79.0 - 97.0 fL    MCH 28.2 26.6 - 33.0 pg    MCHC 33.4 31.5 - 35.7 g/dL    RDW 14.6 12.3 - 15.4 %    RDW-SD 44.2 37.0 - 54.0 fl    MPV 8.8 6.0 - 12.0 fL    Platelets 255 140 - 450 10*3/mm3    Neutrophil % 47.2 42.7 - 76.0 %    Lymphocyte % 41.1 19.6 - 45.3 %    Monocyte % 7.8 5.0 - 12.0 %    Eosinophil % 3.2 0.3 - 6.2 %    Basophil % 0.7 0.0 - 1.5 %    Neutrophils,  Absolute 6.80 1.70 - 7.00 10*3/mm3    Lymphocytes, Absolute 5.90 (H) 0.70 - 3.10 10*3/mm3    Monocytes, Absolute 1.10 (H) 0.10 - 0.90 10*3/mm3    Eosinophils, Absolute 0.50 (H) 0.00 - 0.40 10*3/mm3    Basophils, Absolute 0.10 0.00 - 0.20 10*3/mm3    nRBC 0.3 (H) 0.0 - 0.2 /100 WBC   Gold Top - SST   Result Value Ref Range    Extra Tube Hold for add-ons.    Light Blue Top   Result Value Ref Range    Extra Tube hold for add-on                                           MDM   Patient had the above evaluation.  Results were discussed with the patient.  Patient was initially very combative and confused however this appears to be related to a postictal state.  Very quickly patient became awake and alert and cooperative.  He has no complaints.  Patient's girlfriend states he has not been taking his Keppra and he is almost out of his Keppra.  She states he sometimes takes extra gabapentin due to chronic pain but patient denies this at this time.  Patient will be discharged and will be given a new prescription for Keppra.      Final diagnoses:   Seizure (HCC)       ED Disposition  ED Disposition     ED Disposition Condition Comment    Discharge Stable           No follow-up provider specified.       Where to Get Your Medications      These medications were sent to [facility]/pharmacy #[id number] - [place], [place] - [address] [facility] [phone] [place] [phone]   [address] [facility] [place] [address]    Hours: 24-hours Phone: [phone]   · levETIRAcetam 500 MG tablet        Medication List      No changes were made to your prescriptions during this visit.          Olaf Marie MD  11/12/21 3041

## 2021-11-12 NOTE — ED NOTES
Patient comes in after girlfriend called EMS stating that patient had taken approx 20 gabapentin bills recreationally and was experiencing seizure-like activity. Girlfriend reported to EMS that patient's seizure lasted 2 min and he has a history of seizures but has not been taken his keppra. On ED arrival patient is postictal and combative, was placed in brief hold by security due to combativeness and attempted to bite , decision to place patient in 4-point restraints was made at that time with Dr. Marie at bedside. Continuous 1:1 observation initiated.     Kathie Riggins, RN  11/12/21 0152

## 2022-03-15 ENCOUNTER — HOSPITAL ENCOUNTER (EMERGENCY)
Facility: HOSPITAL | Age: 32
Discharge: HOME | End: 2022-03-15
Admitting: EMERGENCY MEDICINE

## 2022-03-15 ENCOUNTER — APPOINTMENT (OUTPATIENT)
Dept: GENERAL RADIOLOGY | Facility: HOSPITAL | Age: 32
End: 2022-03-15

## 2022-03-15 VITALS
DIASTOLIC BLOOD PRESSURE: 72 MMHG | HEART RATE: 60 BPM | SYSTOLIC BLOOD PRESSURE: 117 MMHG | OXYGEN SATURATION: 98 % | TEMPERATURE: 98 F | HEIGHT: 67 IN | WEIGHT: 220.68 LBS | RESPIRATION RATE: 16 BRPM | BODY MASS INDEX: 34.64 KG/M2

## 2022-03-15 DIAGNOSIS — M25.522 PAIN AND SWELLING OF LEFT ELBOW: Primary | ICD-10-CM

## 2022-03-15 DIAGNOSIS — M25.422 PAIN AND SWELLING OF LEFT ELBOW: Primary | ICD-10-CM

## 2022-03-15 LAB
BASOPHILS # BLD AUTO: 0.1 10*3/MM3 (ref 0–0.2)
BASOPHILS NFR BLD AUTO: 0.7 % (ref 0–1.5)
DEPRECATED RDW RBC AUTO: 42 FL (ref 37–54)
EOSINOPHIL # BLD AUTO: 0.4 10*3/MM3 (ref 0–0.4)
EOSINOPHIL NFR BLD AUTO: 6 % (ref 0.3–6.2)
ERYTHROCYTE [DISTWIDTH] IN BLOOD BY AUTOMATED COUNT: 14.6 % (ref 12.3–15.4)
ERYTHROCYTE [SEDIMENTATION RATE] IN BLOOD: 11 MM/HR (ref 0–15)
HCT VFR BLD AUTO: 38.4 % (ref 37.5–51)
HGB BLD-MCNC: 12.5 G/DL (ref 13–17.7)
LYMPHOCYTES # BLD AUTO: 3.3 10*3/MM3 (ref 0.7–3.1)
LYMPHOCYTES NFR BLD AUTO: 43.8 % (ref 19.6–45.3)
MCH RBC QN AUTO: 26.9 PG (ref 26.6–33)
MCHC RBC AUTO-ENTMCNC: 32.6 G/DL (ref 31.5–35.7)
MCV RBC AUTO: 82.4 FL (ref 79–97)
MONOCYTES # BLD AUTO: 0.6 10*3/MM3 (ref 0.1–0.9)
MONOCYTES NFR BLD AUTO: 8.2 % (ref 5–12)
NEUTROPHILS NFR BLD AUTO: 3.1 10*3/MM3 (ref 1.7–7)
NEUTROPHILS NFR BLD AUTO: 41.3 % (ref 42.7–76)
NRBC BLD AUTO-RTO: 0.2 /100 WBC (ref 0–0.2)
PLATELET # BLD AUTO: 208 10*3/MM3 (ref 140–450)
PMV BLD AUTO: 7.9 FL (ref 6–12)
RBC # BLD AUTO: 4.66 10*6/MM3 (ref 4.14–5.8)
URATE SERPL-MCNC: 7.1 MG/DL (ref 3.4–7)
WBC NRBC COR # BLD: 7.4 10*3/MM3 (ref 3.4–10.8)

## 2022-03-15 PROCEDURE — 73080 X-RAY EXAM OF ELBOW: CPT

## 2022-03-15 PROCEDURE — 99283 EMERGENCY DEPT VISIT LOW MDM: CPT

## 2022-03-15 PROCEDURE — 25010000002 KETOROLAC TROMETHAMINE PER 15 MG: Performed by: PHYSICIAN ASSISTANT

## 2022-03-15 PROCEDURE — 85652 RBC SED RATE AUTOMATED: CPT | Performed by: PHYSICIAN ASSISTANT

## 2022-03-15 PROCEDURE — 85025 COMPLETE CBC W/AUTO DIFF WBC: CPT | Performed by: PHYSICIAN ASSISTANT

## 2022-03-15 PROCEDURE — 25010000002 METHYLPREDNISOLONE PER 40 MG: Performed by: EMERGENCY MEDICINE

## 2022-03-15 PROCEDURE — 96374 THER/PROPH/DIAG INJ IV PUSH: CPT

## 2022-03-15 PROCEDURE — 84550 ASSAY OF BLOOD/URIC ACID: CPT | Performed by: PHYSICIAN ASSISTANT

## 2022-03-15 PROCEDURE — 96372 THER/PROPH/DIAG INJ SC/IM: CPT

## 2022-03-15 RX ORDER — NAPROXEN 500 MG/1
500 TABLET ORAL 2 TIMES DAILY PRN
Qty: 14 TABLET | Refills: 0 | Status: SHIPPED | OUTPATIENT
Start: 2022-03-15

## 2022-03-15 RX ORDER — METHYLPREDNISOLONE SODIUM SUCCINATE 125 MG/2ML
125 INJECTION, POWDER, LYOPHILIZED, FOR SOLUTION INTRAMUSCULAR; INTRAVENOUS ONCE
Status: DISCONTINUED | OUTPATIENT
Start: 2022-03-15 | End: 2022-03-15

## 2022-03-15 RX ORDER — SODIUM CHLORIDE 0.9 % (FLUSH) 0.9 %
10 SYRINGE (ML) INJECTION AS NEEDED
Status: DISCONTINUED | OUTPATIENT
Start: 2022-03-15 | End: 2022-03-15 | Stop reason: HOSPADM

## 2022-03-15 RX ORDER — METHYLPREDNISOLONE 4 MG/1
TABLET ORAL
Qty: 21 TABLET | Refills: 0 | Status: SHIPPED | OUTPATIENT
Start: 2022-03-15 | End: 2022-03-15

## 2022-03-15 RX ORDER — KETOROLAC TROMETHAMINE 15 MG/ML
15 INJECTION, SOLUTION INTRAMUSCULAR; INTRAVENOUS ONCE
Status: COMPLETED | OUTPATIENT
Start: 2022-03-15 | End: 2022-03-15

## 2022-03-15 RX ORDER — METHYLPREDNISOLONE ACETATE 40 MG/ML
80 INJECTION, SUSPENSION INTRA-ARTICULAR; INTRALESIONAL; INTRAMUSCULAR; SOFT TISSUE ONCE
Status: COMPLETED | OUTPATIENT
Start: 2022-03-15 | End: 2022-03-15

## 2022-03-15 RX ORDER — METHYLPREDNISOLONE 4 MG/1
TABLET ORAL
Qty: 21 TABLET | Refills: 0 | Status: SHIPPED | OUTPATIENT
Start: 2022-03-15

## 2022-03-15 RX ORDER — NAPROXEN 500 MG/1
500 TABLET ORAL 2 TIMES DAILY WITH MEALS
Qty: 14 TABLET | Refills: 0 | Status: SHIPPED | OUTPATIENT
Start: 2022-03-15 | End: 2022-03-15

## 2022-03-15 RX ADMIN — METHYLPREDNISOLONE ACETATE 80 MG: 40 INJECTION, SUSPENSION INTRA-ARTICULAR; INTRALESIONAL; INTRAMUSCULAR; INTRASYNOVIAL; SOFT TISSUE at 18:52

## 2022-03-15 RX ADMIN — KETOROLAC TROMETHAMINE 15 MG: 15 INJECTION, SOLUTION INTRAMUSCULAR; INTRAVENOUS at 18:44

## 2022-03-15 NOTE — ED PROVIDER NOTES
"Subjective   Chief Complaint: Left arm pain    Patient is a 31-year-old  male with no pertinent past medical history presenting to ED for complaints of left arm pain x1 day.  Patient states that he has had pain in his left elbow for years but states that he woke up this morning the pain was worse than his normal.  He denies any recent trauma or fall.  Patient reports the pain as a ache over the left elbow that he rates a 10/10.  Patient states that he has had something similar like this happen in the past and was given a \"steroid injection\" for pain management.  Upon further questioning, patient reports that he has a longstanding history of injury to this elbow and has been managed by outpatient orthopedics as well as referred to [facility] for further evaluation.  Patient reports that his insurance did not cover [facility] therefore he has not continued to seek outpatient management at this time.  Patient denies any fever, chills, or injury prior to onset this morning.  Patient denies any other focal symptoms at this time    Location: Left elbow    Quality: Ache    Duration: 1 day    Timing: Constant    Severity: Moderate to severe    Associated Symptoms: Warmth, swelling, decreased range of motion    PCP: None      History provided by:  Patient      Review of Systems   Constitutional: Negative for chills and fever.   HENT: Negative for sore throat and trouble swallowing.    Respiratory: Negative for shortness of breath and wheezing.    Cardiovascular: Negative for chest pain.   Gastrointestinal: Negative for abdominal pain, diarrhea, nausea and vomiting.   Genitourinary: Negative for dysuria.   Musculoskeletal: Positive for arthralgias and joint swelling.        Pain in left elbow, swelling of left elbow   Skin: Negative for rash.   Neurological: Negative for headaches.   Psychiatric/Behavioral: Negative for behavioral problems.   All other systems reviewed and are " negative.      Past Medical History:   Diagnosis Date   • Arm pain        No Known Allergies    Past Surgical History:   Procedure Laterality Date   • DENTAL PROCEDURE     • TONSILLECTOMY         Family History   Problem Relation Age of Onset   • Diabetes Father        Social History     Socioeconomic History   • Marital status: Single   Tobacco Use   • Smoking status: Current Every Day Smoker     Packs/day: 1.00   • Smokeless tobacco: Never Used   Substance and Sexual Activity   • Drug use: No   • Sexual activity: Defer           Objective   Physical Exam  Vitals and nursing note reviewed.   Constitutional:       General: He is not in acute distress.     Appearance: Normal appearance. He is normal weight.      Comments: Patient grimacing on exam   HENT:      Head: Normocephalic and atraumatic.   Cardiovascular:      Rate and Rhythm: Normal rate and regular rhythm.   Pulmonary:      Effort: Pulmonary effort is normal.      Breath sounds: Normal breath sounds.   Abdominal:      General: Abdomen is flat.      Tenderness: There is no abdominal tenderness.   Musculoskeletal:         General: Swelling and tenderness present. No deformity.      Right lower leg: No edema.      Left lower leg: No edema.      Comments: Pain on active and passive range of motion of left arm, worse with left elbow extension.    Very mild erythema over posterior elbow joint, no significant warmth  Radial pulses present 2+ bilaterally, sensation soft touch intact,  strength appropriate bilaterally   Skin:     General: Skin is warm.      Capillary Refill: Capillary refill takes less than 2 seconds.      Findings: No erythema.   Neurological:      General: No focal deficit present.      Mental Status: He is alert and oriented to person, place, and time.      Comments: Negative hypoesthesia/paresthesia of the bilateral upper extremities.  No focal deficits affecting distribution of radial nerve.   Psychiatric:         Mood and Affect: Mood  "normal.         Behavior: Behavior normal.         Procedures           ED Course  ED Course as of 03/15/22 2102   Tue Mar 15, 2022   1822 MRI left elbow 8/15/19  IMPRESSION:  1. 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 One anterior wall and posterior radial. Smaller body suggested along the proximal radial neck near the annular ligament. Some erosive bodies likely due to remote trauma. There is irregularity along the capitellum which could be seen with previous site of subchondral lesion.  2. There is at least partial disruption of the radial collateral ligament proximally and partial disruption of the deep fibers of the common extensor tendon origin.    Electronically Signed: Archana Sahu MD 8/16/2019 10:19 EDT [MM]   1951 Patient reevaluated, reports that he feels much better at this time [MM]      ED Course User Index  [MM] Ely Hager PA    /72   Pulse 60   Temp 98 °F (36.7 °C)   Resp 16   Ht 170.2 cm (67\")   Wt 100 kg (220 lb 10.9 oz)   SpO2 98%   BMI 34.56 kg/m²   Labs Reviewed   URIC ACID - Abnormal; Notable for the following components:       Result Value    Uric Acid 7.1 (*)     All other components within normal limits   CBC WITH AUTO DIFFERENTIAL - Abnormal; Notable for the following components:    Hemoglobin 12.5 (*)     Neutrophil % 41.3 (*)     Lymphocytes, Absolute 3.30 (*)     All other components within normal limits   SEDIMENTATION RATE - Normal   CBC AND DIFFERENTIAL    Narrative:     The following orders were created for panel order CBC & Differential.  Procedure                               Abnormality         Status                     ---------                               -----------         ------                     CBC Auto Differential[[id number]]        Abnormal            Final result                 Please view results for these tests on the individual orders.     Medications   sodium chloride 0.9 % flush 10 mL (has no " administration in time range)   ketorolac (TORADOL) injection 15 mg (15 mg Intravenous Given 3/15/22 1844)   methylPREDNISolone acetate (DEPO-medrol) injection 80 mg (80 mg Intramuscular Given 3/15/22 1852)     XR Elbow 3+ View Left    Result Date: 3/15/2022  No acute bony abnormality. Mild degenerative joint disease is present with 2 chronic ossifications as previously described, they are likely loose joint bodies. There is question of a small joint effusion.  Electronically Signed By-Slade Verde DO On:3/15/2022 7:19 PM This report was finalized on [id number] by  Slade Verde DO.                                                 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  Patient had the above evaluation.  IV established, lab obtained.  Patient given Toradol for pain as well as Depo-Medrol injection.  Patient reports that he feels better.  X-ray shows no acute bony abnormality, mild degenerative joint disease present with 2 chronic ossifications previously described, questionable small joint effusion.  Findings discussed with patient at bedside.  Patient has had MRI in 2019 for similar complaints, this is an ongoing, chronic issue.  Patient is requesting referral to orthopedic  specialist in [place].  Patient discharged with prescription for Medrol Dosepak and naproxen he was referred to orthopedic specialist in this area.  Patient also given a sling for comfort.  Patient was neurovascular intact distally post sling placement.    Discharge plan and instructions were discussed with the patient who verbalized understanding and is in agreement with the plan, all questions were answered at this time.  Patient is aware of signs symptoms that would require immediate return to the emergency room.  Patient understands importance of following up with primary care provider for further evaluation and worsening concerns as well as blood pressure recheck in the next 4 weeks.    Patient was discharged in improved stable condition with an upright steady gait.       Amount and/or Complexity of Data Reviewed  Clinical lab tests: reviewed and ordered  Tests in the radiology section of CPT®: reviewed and ordered    Patient Progress  Patient progress: stable      Final diagnoses:   Pain and swelling of left elbow       ED Disposition  ED Disposition     ED Disposition   Discharge    Condition   Stable    Comment   --             PATIENT CONNECTION - [place] [address]  [phone]  Schedule an appointment as soon as possible for a visit in 2 days  As needed, If symptoms worsen    Lucas Bajwa II, MD  [address] [place] [address] [phone]    Schedule an appointment as soon as possible for a visit in 2 days  As needed, If symptoms worsen    Conrado Galvez MD  [address] [facility] [place] [address]  [phone]    Schedule an appointment as soon as possible for a visit in 2 days  As needed, If symptoms worsen         Medication List      New Prescriptions    methylPREDNISolone 4 MG dose pack  Commonly known as: MEDROL  Take as directed on package instructions.     naproxen 500 MG EC tablet  Commonly known as: EC NAPROSYN  Take 1 tablet by mouth 2  (Two) Times a Day As Needed for Mild Pain .           Where to Get Your Medications      These medications were sent to [facility]/pharmacy #[id number] - [place], [place] - [address] [place] [address] - [phone]  - [phone] [address] [place] [address]    Hours: 24-hours Phone: [phone]   · methylPREDNISolone 4 MG dose pack  · naproxen 500 MG EC tablet          Ely Hager PA  03/15/22 7049

## 2022-03-16 NOTE — DISCHARGE INSTRUCTIONS
Take naproxen as needed for pain  Take the Medrol Dosepak to completion  Wear sling as needed for comfort    Follow-up with primary care for recheck  Follow-up with Ortho for repeat evaluation and treatment as needed  Return to the ER for new or worsening symptoms

## 2022-07-24 ENCOUNTER — HOSPITAL ENCOUNTER (EMERGENCY)
Facility: HOSPITAL | Age: 32
Discharge: HOME | End: 2022-07-25
Attending: EMERGENCY MEDICINE | Admitting: EMERGENCY MEDICINE

## 2022-07-24 DIAGNOSIS — R56.9 SEIZURE: Primary | ICD-10-CM

## 2022-07-24 PROCEDURE — 96375 TX/PRO/DX INJ NEW DRUG ADDON: CPT

## 2022-07-24 PROCEDURE — 99284 EMERGENCY DEPT VISIT MOD MDM: CPT

## 2022-07-24 PROCEDURE — 36415 COLL VENOUS BLD VENIPUNCTURE: CPT

## 2022-07-24 PROCEDURE — 96374 THER/PROPH/DIAG INJ IV PUSH: CPT

## 2022-07-25 ENCOUNTER — APPOINTMENT (OUTPATIENT)
Dept: CT IMAGING | Facility: HOSPITAL | Age: 32
End: 2022-07-25

## 2022-07-25 VITALS
WEIGHT: 240 LBS | TEMPERATURE: 98.8 F | RESPIRATION RATE: 18 BRPM | HEIGHT: 73 IN | SYSTOLIC BLOOD PRESSURE: 115 MMHG | DIASTOLIC BLOOD PRESSURE: 74 MMHG | BODY MASS INDEX: 31.81 KG/M2 | HEART RATE: 97 BPM | OXYGEN SATURATION: 95 %

## 2022-07-25 LAB
ALBUMIN SERPL-MCNC: 4.5 G/DL (ref 3.5–5.2)
ALBUMIN/GLOB SERPL: 1.7 G/DL
ALP SERPL-CCNC: 107 U/L (ref 39–117)
ALT SERPL W P-5'-P-CCNC: 18 U/L (ref 1–41)
ANION GAP SERPL CALCULATED.3IONS-SCNC: 29 MMOL/L (ref 5–15)
AST SERPL-CCNC: 25 U/L (ref 1–40)
BASOPHILS # BLD AUTO: 0.1 10*3/MM3 (ref 0–0.2)
BASOPHILS NFR BLD AUTO: 0.5 % (ref 0–1.5)
BILIRUB SERPL-MCNC: 0.3 MG/DL (ref 0–1.2)
BUN SERPL-MCNC: 7 MG/DL (ref 6–20)
BUN/CREAT SERPL: 6.9 (ref 7–25)
CALCIUM SPEC-SCNC: 9.3 MG/DL (ref 8.6–10.5)
CHLORIDE SERPL-SCNC: 104 MMOL/L (ref 98–107)
CO2 SERPL-SCNC: 14 MMOL/L (ref 22–29)
CREAT SERPL-MCNC: 1.02 MG/DL (ref 0.76–1.27)
DEPRECATED RDW RBC AUTO: 45.5 FL (ref 37–54)
EGFRCR SERPLBLD CKD-EPI 2021: 101.4 ML/MIN/1.73
EOSINOPHIL # BLD AUTO: 0.3 10*3/MM3 (ref 0–0.4)
EOSINOPHIL NFR BLD AUTO: 1.7 % (ref 0.3–6.2)
ERYTHROCYTE [DISTWIDTH] IN BLOOD BY AUTOMATED COUNT: 14.7 % (ref 12.3–15.4)
ETHANOL UR QL: <0.01 %
GLOBULIN UR ELPH-MCNC: 2.7 GM/DL
GLUCOSE BLDC GLUCOMTR-MCNC: 123 MG/DL (ref 70–105)
GLUCOSE SERPL-MCNC: 120 MG/DL (ref 65–99)
HCT VFR BLD AUTO: 41.6 % (ref 37.5–51)
HGB BLD-MCNC: 12.9 G/DL (ref 13–17.7)
LYMPHOCYTES # BLD AUTO: 5 10*3/MM3 (ref 0.7–3.1)
LYMPHOCYTES NFR BLD AUTO: 26.9 % (ref 19.6–45.3)
MCH RBC QN AUTO: 27 PG (ref 26.6–33)
MCHC RBC AUTO-ENTMCNC: 30.9 G/DL (ref 31.5–35.7)
MCV RBC AUTO: 87.2 FL (ref 79–97)
MONOCYTES # BLD AUTO: 1.6 10*3/MM3 (ref 0.1–0.9)
MONOCYTES NFR BLD AUTO: 8.9 % (ref 5–12)
NEUTROPHILS NFR BLD AUTO: 11.5 10*3/MM3 (ref 1.7–7)
NEUTROPHILS NFR BLD AUTO: 62 % (ref 42.7–76)
NRBC BLD AUTO-RTO: 0 /100 WBC (ref 0–0.2)
PLATELET # BLD AUTO: 223 10*3/MM3 (ref 140–450)
PMV BLD AUTO: 9 FL (ref 6–12)
POTASSIUM SERPL-SCNC: 3.5 MMOL/L (ref 3.5–5.2)
PROT SERPL-MCNC: 7.2 G/DL (ref 6–8.5)
RBC # BLD AUTO: 4.77 10*6/MM3 (ref 4.14–5.8)
SODIUM SERPL-SCNC: 147 MMOL/L (ref 136–145)
WBC NRBC COR # BLD: 18.6 10*3/MM3 (ref 3.4–10.8)

## 2022-07-25 PROCEDURE — 25010000002 MIDAZOLAM PER 1 MG

## 2022-07-25 PROCEDURE — 0 LEVETIRACETAM IN NACL 0.75% 1000 MG/100ML SOLUTION: Performed by: PHYSICIAN ASSISTANT

## 2022-07-25 PROCEDURE — 85025 COMPLETE CBC W/AUTO DIFF WBC: CPT | Performed by: PHYSICIAN ASSISTANT

## 2022-07-25 PROCEDURE — 80053 COMPREHEN METABOLIC PANEL: CPT | Performed by: PHYSICIAN ASSISTANT

## 2022-07-25 PROCEDURE — 82077 ASSAY SPEC XCP UR&BREATH IA: CPT | Performed by: PHYSICIAN ASSISTANT

## 2022-07-25 PROCEDURE — 82962 GLUCOSE BLOOD TEST: CPT

## 2022-07-25 PROCEDURE — 70450 CT HEAD/BRAIN W/O DYE: CPT

## 2022-07-25 RX ORDER — MIDAZOLAM HYDROCHLORIDE 1 MG/ML
2 INJECTION INTRAMUSCULAR; INTRAVENOUS ONCE
Status: COMPLETED | OUTPATIENT
Start: 2022-07-25 | End: 2022-07-25

## 2022-07-25 RX ORDER — LEVETIRACETAM 10 MG/ML
1000 INJECTION INTRAVASCULAR ONCE
Status: COMPLETED | OUTPATIENT
Start: 2022-07-25 | End: 2022-07-25

## 2022-07-25 RX ORDER — MIDAZOLAM HYDROCHLORIDE 1 MG/ML
INJECTION INTRAMUSCULAR; INTRAVENOUS
Status: COMPLETED
Start: 2022-07-25 | End: 2022-07-25

## 2022-07-25 RX ORDER — SODIUM CHLORIDE 0.9 % (FLUSH) 0.9 %
10 SYRINGE (ML) INJECTION AS NEEDED
Status: DISCONTINUED | OUTPATIENT
Start: 2022-07-25 | End: 2022-07-25 | Stop reason: HOSPADM

## 2022-07-25 RX ORDER — LEVETIRACETAM 500 MG/1
500 TABLET ORAL 2 TIMES DAILY
Qty: 60 TABLET | Refills: 0 | Status: SHIPPED | OUTPATIENT
Start: 2022-07-25 | End: 2022-08-24

## 2022-07-25 RX ADMIN — LEVETIRACETAM 1000 MG: 10 INJECTION INTRAVASCULAR at 00:33

## 2022-07-25 RX ADMIN — MIDAZOLAM HYDROCHLORIDE 2 MG: 1 INJECTION INTRAMUSCULAR; INTRAVENOUS at 00:15

## 2022-07-25 RX ADMIN — MIDAZOLAM 2 MG: 1 INJECTION INTRAMUSCULAR; INTRAVENOUS at 00:15

## 2022-07-25 NOTE — ED PROVIDER NOTES
Subjective       Patient is a 31-year-old male comes in complaining of seizure-like activity just prior to arrival earlier today.  Patient was alert and oriented upon initial evaluation times 3 out of 3 however patient had subsequent seizure during evaluation.  EMS had reported that patient had not been taking home Keppra as previously prescribed.  No family at bedside on initial evaluation.  Patient history was limited secondary to acuity of condition and subsequent seizure-like activity. .          Review of Systems   Unable to perform ROS: Acuity of condition       Past Medical History:   Diagnosis Date   • Arm pain        No Known Allergies    Past Surgical History:   Procedure Laterality Date   • DENTAL PROCEDURE     • TONSILLECTOMY         Family History   Problem Relation Age of Onset   • Diabetes Father        Social History     Socioeconomic History   • Marital status: Single   Tobacco Use   • Smoking status: Current Every Day Smoker     Packs/day: 1.00   • Smokeless tobacco: Never Used   Substance and Sexual Activity   • Drug use: No   • Sexual activity: Defer           Objective   Physical Exam  Vitals and nursing note reviewed.   Constitutional:       General: He is not in acute distress.     Appearance: He is well-developed. He is not diaphoretic.   HENT:      Head: Normocephalic and atraumatic.      Right Ear: External ear normal.      Left Ear: External ear normal.      Nose: Nose normal.      Mouth/Throat:      Pharynx: No oropharyngeal exudate.   Eyes:      Extraocular Movements: Extraocular movements intact.      Conjunctiva/sclera: Conjunctivae normal.      Pupils: Pupils are equal, round, and reactive to light.   Cardiovascular:      Rate and Rhythm: Normal rate and regular rhythm.      Pulses: Normal pulses.      Heart sounds: Normal heart sounds.      Comments: S1, S2 audible.  Pulmonary:      Effort: Pulmonary effort is normal. No respiratory distress.      Breath sounds: Normal breath sounds.  "No wheezing, rhonchi or rales.      Comments: On room air.  Abdominal:      General: Bowel sounds are normal. There is no distension.      Palpations: Abdomen is soft.      Tenderness: There is no abdominal tenderness. There is no guarding or rebound.   Musculoskeletal:         General: No tenderness or deformity. Normal range of motion.      Cervical back: Normal range of motion.   Skin:     General: Skin is warm.      Capillary Refill: Capillary refill takes less than 2 seconds.      Findings: No erythema or rash.   Neurological:      General: No focal deficit present.      Mental Status: He is alert and oriented to person, place, and time. Mental status is at baseline.      Cranial Nerves: No cranial nerve deficit.      Sensory: No sensory deficit.      Motor: No weakness.      Coordination: Coordination normal.      Comments: Patient was having tonic-clonic seizure upon initial evaluation and had apparent postictal state.  Patient was reevaluated after this to obtain physical exam.   Psychiatric:         Mood and Affect: Mood normal.         Behavior: Behavior normal.         Procedures           ED Course      /74 (BP Location: Right arm, Patient Position: Lying)   Pulse 118   Temp 98.6 °F (37 °C) (Oral)   Resp 18   Ht 185.4 cm (73\")   Wt 109 kg (240 lb)   SpO2 94%   BMI 31.66 kg/m²   Labs Reviewed   COMPREHENSIVE METABOLIC PANEL - Abnormal; Notable for the following components:       Result Value    Glucose 120 (*)     Sodium 147 (*)     CO2 14.0 (*)     BUN/Creatinine Ratio 6.9 (*)     Anion Gap 29.0 (*)     All other components within normal limits    Narrative:     GFR Normal >60  Chronic Kidney Disease <60  Kidney Failure <15     CBC WITH AUTO DIFFERENTIAL - Abnormal; Notable for the following components:    WBC 18.60 (*)     Hemoglobin 12.9 (*)     MCHC 30.9 (*)     Neutrophils, Absolute 11.50 (*)     Lymphocytes, Absolute 5.00 (*)     Monocytes, Absolute 1.60 (*)     All other components " "within normal limits   POCT GLUCOSE FINGERSTICK - Abnormal; Notable for the following components:    Glucose 123 (*)     All other components within normal limits   ETHANOL    Narrative:     Plasma Ethanol Clinical Symptoms:    ETOH (%)               Clinical Symptom  .01-.05              No apparent influence  .03-.12              Euphoria, Diminished judgment and attention   .09-.25              Impaired comprehension, Muscle incoordination  .18-.30              Confusion, Staggered gait, Slurred speech  .25-.40              Markedly decreased response to stimuli, unable to stand or                        walk, vomitting, sleep or stupor  .35-.50              Comatose, Anesthesia, Subnormal body temperature       URINE DRUG SCREEN   POCT GLUCOSE FINGERSTICK   CBC AND DIFFERENTIAL    Narrative:     The following orders were created for panel order CBC & Differential.  Procedure                               Abnormality         Status                     ---------                               -----------         ------                     CBC Auto Differential[[id number]]        Abnormal            Final result                 Please view results for these tests on the individual orders.     CT Head Without Contrast    Result Date: 7/25/2022  No acute intracranial abnormality. Electronically signed by:  Dominick Pelayo M.D.  7/24/2022 11:30 PM                                         [facility]     Chart review:  NKA  EKG:  Not indicated  Imaging: See above  Labs: White blood cell count slightly elevated 18.6 otherwise largely unremarkable CBC and CMP.  Ethanol level negative.   Vitals:  /74 (BP Location: Right arm, Patient Position: Lying)   Pulse 118   Temp 98.6 °F (37 °C) (Oral)   Resp 18   Ht 185.4 cm (73\")   Wt 109 kg (240 lb)   SpO2 94%   BMI 31.66 kg/m²     Medications given:    Medications   sodium chloride 0.9 % flush 10 mL (has no administration in time range)   midazolam (VERSED) injection 2 mg (2 mg " Intravenous Given 7/25/22 0015)   levETIRAcetam in NaCl 0.75% (KEPPRA) IVPB 1,000 mg (0 mg Intravenous Stopped 7/25/22 0202)       Procedures:    MDM: Patient is a 31-year-old male comes in complaining of seizure-like activities prior to arrival.  Patient was alert and oriented times 3 out of 3 on initial evaluation however patient had subsequent seizure-like activity and patient was given Versed for this.  Patient was also loaded with IV Keppra here in the ER.  White blood cell count slightly elevated 18.6 otherwise largely unremarkable CBC and CMP.  White blood cell count likely elevated secondary to seizure activity.  Ethanol level negative.  CT head unremarkable for acute findings.  Patient was resting comfortably on reevaluation.  Family at bedside states that patient has not been taking Keppra at home as he ran out of this and is not regularly prescribed this however her home med list says otherwise.  Family states he also took more gabapentin than he usually does and this has triggered his seizures in the past.  Upon discharge, patient denies any SI, HI.  Patient states he takes this for peripheral neuropathy.  Patient was given strict return precautions and family at bedside also were given strict return precautions and voiced understanding. See full discharge instructions for further details.  Results and plan discussed with patient and is agreeable with plan.    Final diagnoses:   Seizure (HCC)       ED Disposition  ED Disposition     ED Disposition   Discharge    Condition   Stable    Comment   --             [facility] EMERGENCY DEPARTMENT  [address] [facility] [address] [phone]  Go in 1 day  As needed, If symptoms worsen    PATIENT CONNECTION - [facility] [address] [phone]  Call in 1 week  As needed to set up a primary care provider    Seipel, Joseph F, MD  [address] [place] [address] [phone]    Schedule an appointment as soon as  possible for a visit in 1 week  for seizure follow up         Where to Get Your Medications      These medications were sent to [facility] [address] [facility] - [place], [place] - [address] [facility] - [phone]  - [phone] FX  [address] [facility] [place] [address]    Phone: [phone]   · levETIRAcetam 500 MG tablet        Medication List      No changes were made to your prescriptions during this visit.          Kristofer Torres PA  07/25/22 0346

## 2022-07-25 NOTE — DISCHARGE INSTRUCTIONS
Please do not take extra gabapentin and only take this as previously prescribed.  Please take Keppra as previously prescribed as well.  Please contact the ER if you have further seizure-like activity or have thoughts of harming yourself or others as you will need reevaluation time.

## 2022-08-04 ENCOUNTER — HOSPITAL ENCOUNTER (EMERGENCY)
Facility: HOSPITAL | Age: 32
Discharge: HOME | End: 2022-08-04
Attending: EMERGENCY MEDICINE | Admitting: EMERGENCY MEDICINE

## 2022-08-04 ENCOUNTER — APPOINTMENT (OUTPATIENT)
Dept: GENERAL RADIOLOGY | Facility: HOSPITAL | Age: 32
End: 2022-08-04

## 2022-08-04 VITALS
HEIGHT: 67 IN | DIASTOLIC BLOOD PRESSURE: 69 MMHG | OXYGEN SATURATION: 98 % | TEMPERATURE: 98 F | HEART RATE: 76 BPM | RESPIRATION RATE: 17 BRPM | SYSTOLIC BLOOD PRESSURE: 113 MMHG | BODY MASS INDEX: 33.6 KG/M2 | WEIGHT: 214.07 LBS

## 2022-08-04 DIAGNOSIS — R07.81 RIB PAIN ON LEFT SIDE: Primary | ICD-10-CM

## 2022-08-04 PROCEDURE — 99283 EMERGENCY DEPT VISIT LOW MDM: CPT

## 2022-08-04 PROCEDURE — 63710000001 ONDANSETRON ODT 4 MG TABLET DISPERSIBLE: Performed by: NURSE PRACTITIONER

## 2022-08-04 PROCEDURE — 71101 X-RAY EXAM UNILAT RIBS/CHEST: CPT

## 2022-08-04 RX ORDER — ONDANSETRON 4 MG/1
4 TABLET, ORALLY DISINTEGRATING ORAL ONCE
Status: COMPLETED | OUTPATIENT
Start: 2022-08-04 | End: 2022-08-04

## 2022-08-04 RX ORDER — HYDROCODONE BITARTRATE AND ACETAMINOPHEN 5; 325 MG/1; MG/1
1 TABLET ORAL ONCE AS NEEDED
Status: COMPLETED | OUTPATIENT
Start: 2022-08-04 | End: 2022-08-04

## 2022-08-04 RX ORDER — LIDOCAINE 50 MG/G
1 PATCH TOPICAL EVERY 24 HOURS
Qty: 5 PATCH | Refills: 0 | Status: SHIPPED | OUTPATIENT
Start: 2022-08-04

## 2022-08-04 RX ORDER — IBUPROFEN 800 MG/1
800 TABLET ORAL
Qty: 15 TABLET | Refills: 0 | Status: SHIPPED | OUTPATIENT
Start: 2022-08-04 | End: 2022-08-09

## 2022-08-04 RX ORDER — LIDOCAINE 50 MG/G
1 PATCH TOPICAL ONCE
Status: DISCONTINUED | OUTPATIENT
Start: 2022-08-04 | End: 2022-08-04 | Stop reason: HOSPADM

## 2022-08-04 RX ADMIN — ONDANSETRON 4 MG: 4 TABLET, ORALLY DISINTEGRATING ORAL at 17:58

## 2022-08-04 RX ADMIN — HYDROCODONE BITARTRATE AND ACETAMINOPHEN 1 TABLET: 5; 325 TABLET ORAL at 17:58

## 2022-08-04 RX ADMIN — LIDOCAINE 1 PATCH: 50 PATCH CUTANEOUS at 17:58

## 2022-08-04 NOTE — ED PROVIDER NOTES
Subjective   31-year-old male presents with complaint of left anterior rib pain after falling onto the steps 4 days ago.  He denies dyspnea.  He reports that he is a daily smoker.     1. Location: Left anterior ribs  2. Quality: Sore  3. Severity: Moderate  4. Worsening factors: Movement, palpation, deep inspiration  5. Alleviating factors: Denies  6. Onset: 4 days  7. Radiation: Lateral ribs  8. Frequency: Constant with periods of intensity  9. Co-morbidities: Past Medical History:  No date: Arm pain  10. Source: Patient            Review of Systems   HENT: Negative for postnasal drip.    Respiratory: Negative for cough, chest tightness, shortness of breath and wheezing.    Cardiovascular: Positive for chest pain. Negative for palpitations and leg swelling.   Skin: Negative for color change, pallor and rash.   Neurological: Negative for dizziness, syncope, weakness and numbness.   Hematological: Does not bruise/bleed easily.   All other systems reviewed and are negative.      Past Medical History:   Diagnosis Date   • Arm pain        No Known Allergies    Past Surgical History:   Procedure Laterality Date   • DENTAL PROCEDURE     • TONSILLECTOMY         Family History   Problem Relation Age of Onset   • Diabetes Father        Social History     Socioeconomic History   • Marital status: Single   Tobacco Use   • Smoking status: Current Every Day Smoker     Packs/day: 1.00   • Smokeless tobacco: Never Used   Substance and Sexual Activity   • Drug use: No   • Sexual activity: Defer           Objective   Physical Exam  Vitals and nursing note reviewed.   Constitutional:       General: He is awake. He is not in acute distress.     Appearance: Normal appearance. He is well-developed and normal weight.   Neck:      Thyroid: No thyromegaly.      Vascular: No JVD.      Trachea: No tracheal deviation.   Cardiovascular:      Rate and Rhythm: Normal rate and regular rhythm.      Pulses: Normal pulses.           Radial pulses  are 2+ on the right side and 2+ on the left side.        Dorsalis pedis pulses are 2+ on the right side and 2+ on the left side.        Posterior tibial pulses are 2+ on the right side and 2+ on the left side.      Heart sounds: Normal heart sounds, S1 normal and S2 normal. Heart sounds not distant. No murmur heard.    No friction rub. No gallop.   Pulmonary:      Effort: Pulmonary effort is normal. No respiratory distress.      Breath sounds: Normal breath sounds and air entry. No wheezing or rales.   Chest:      Chest wall: Tenderness present. No crepitus. There is no dullness to percussion.       Abdominal:      General: Abdomen is flat. Bowel sounds are normal. There is no distension.      Palpations: Abdomen is soft. There is no mass.      Tenderness: There is no abdominal tenderness. There is no guarding or rebound.      Hernia: No hernia is present.   Musculoskeletal:      Cervical back: Normal range of motion and neck supple.   Skin:     General: Skin is warm and dry.      Capillary Refill: Capillary refill takes less than 2 seconds.   Neurological:      Mental Status: He is alert and oriented to person, place, and time.      GCS: GCS eye subscore is 4. GCS verbal subscore is 5. GCS motor subscore is 6.   Psychiatric:         Mood and Affect: Mood normal.         Behavior: Behavior normal. Behavior is cooperative.         Thought Content: Thought content normal.         Judgment: Judgment normal.         Procedures           ED Course    XR Ribs Left With PA Chest    Result Date: 8/4/2022  Normal study.  Electronically Signed By-Akhil Wang MD On:8/4/2022 6:05 PM This report was finalized on [id number] by  Akhil Wang MD.    Medications   lidocaine (LIDODERM) 5 % 1 patch (1 patch Transdermal Medication Applied 8/4/22 1758)   HYDROcodone-acetaminophen (NORCO) 5-325 MG per tablet 1 tablet (1 tablet Oral Given 8/4/22 1758)   ondansetron ODT (ZOFRAN-ODT) disintegrating tablet 4 mg (4 mg Oral Given 8/4/22  1758)     Labs Reviewed - No data to display                                         MDM  Number of Diagnoses or Management Options  Rib pain on left side  Diagnosis management comments: Chart Review: 7/24/2022 patient was seen at this facility for seizure.  Comorbidity: Past Medical History:  No date: Arm pain  Imaging: Was interpreted by physician and reviewed by myself: XR Ribs Left With PA Chest   Final Result    Normal study.         Electronically Signed By-Akhil Wang MD On:8/4/2022 6:05 PM    This report was finalized on [id number] by  Akhil Wang MD.    Patient undressed and placed in gown for exam.  Appropriate PPE worn during patient exam.  Patient is alert and oriented x3. S1-S2 heart sounds on exam.  Lungs are clear to auscultation. Chest wall: Tenderness present.  No ecchymosis nor erythema. Left rib series with Chest x-ray obtained with the above findings.  Patient was given Lidoderm patch, Norco 5/325 p.o. x1, and Zofran 4 mg ODT.  Upon reassessment, patient reports relief with medications given.  He is requesting a prescription for Motrin 800 mg, which she was given in addition to Lidoderm patches.  He is encouraged to rotate heat and ice every 2 hours while awake.  He is in no acute distress.  He is pink warm and dry vital signs are stable.  He is gotten dressed and sitting upright on the side of the bed.    Disposition/Treatment: Discussed results with patient, verbalized understanding.  Discussed reasons to return to the ER, patient verbalized understanding.  Agreeable with plan of care.  Patient was stable upon discharge.       Part of this note may be an electronic transcription/translation of spoken language to printed text using the Dragon Dictation System.          Amount and/or Complexity of Data Reviewed  Tests in the radiology section of CPT®: reviewed    Patient Progress  Patient progress: stable      Final diagnoses:   Rib pain on left side       ED Disposition  ED Disposition      ED Disposition   Discharge    Condition   Stable    Comment   --             PATIENT CONNECTION - [facility] [address]  [phone]  Schedule an appointment as soon as possible for a visit       [facility] EMERGENCY DEPARTMENT  [address] [facility] [address] [phone]  Go to   If symptoms worsen         Medication List      New Prescriptions    ibuprofen 800 MG tablet  Commonly known as: ADVIL,MOTRIN  Take 1 tablet by mouth 3 (Three) Times a Day With Meals for 5 days.     lidocaine 5 %  Commonly known as: LIDODERM  Place 1 patch on the skin as directed by provider Daily. Remove & Discard patch within 12 hours or as directed by MD           Where to Get Your Medications      These medications were sent to [facility] [address] [facility] [employer] - [facility] [place] - [address] [facility] [phone] [place] [phone] FX  [address] [facility] [place] [address]    Phone: [phone]   · ibuprofen 800 MG tablet  · lidocaine 5 %          Eneida Chamorro, APRN  08/04/22 0968

## 2022-08-04 NOTE — DISCHARGE INSTRUCTIONS
Take Motrin as prescribed. Drink 107 ounces of water daily.  When Lidoderm patch is on, apply ice every 2 hours while awake.  When Lidoderm patch is off, apply heat every 2 hours while awake.  Schedule follow-up with your primary care physician for recheck.  Return to the ER for new or worsening symptoms.

## 2023-05-09 ENCOUNTER — HOSPITAL ENCOUNTER (OUTPATIENT)
Facility: HOSPITAL | Age: 33
Discharge: LEFT BEFORE DISCHARGE | End: 2023-05-09
Attending: EMERGENCY MEDICINE | Admitting: EMERGENCY MEDICINE
Payer: MEDICAID

## 2023-05-09 VITALS
WEIGHT: 214 LBS | SYSTOLIC BLOOD PRESSURE: 128 MMHG | RESPIRATION RATE: 18 BRPM | DIASTOLIC BLOOD PRESSURE: 70 MMHG | HEIGHT: 69 IN | BODY MASS INDEX: 31.7 KG/M2 | OXYGEN SATURATION: 100 % | HEART RATE: 112 BPM | TEMPERATURE: 98.2 F

## 2023-05-09 DIAGNOSIS — R56.9 SEIZURE: ICD-10-CM

## 2023-05-09 DIAGNOSIS — T50.904A OVERDOSE OF UNDETERMINED INTENT, INITIAL ENCOUNTER: Primary | ICD-10-CM

## 2023-05-09 PROBLEM — T50.901A OVERDOSE: Status: ACTIVE | Noted: 2023-05-09

## 2023-05-09 LAB
ALBUMIN SERPL-MCNC: 4.4 G/DL (ref 3.5–5.2)
ALBUMIN/GLOB SERPL: 1.6 G/DL
ALP SERPL-CCNC: 92 U/L (ref 39–117)
ALT SERPL W P-5'-P-CCNC: 23 U/L (ref 1–41)
AMPHET+METHAMPHET UR QL: NEGATIVE
ANION GAP SERPL CALCULATED.3IONS-SCNC: 11 MMOL/L (ref 5–15)
APAP SERPL-MCNC: <5 MCG/ML (ref 0–30)
AST SERPL-CCNC: 30 U/L (ref 1–40)
BARBITURATES UR QL SCN: NEGATIVE
BASOPHILS # BLD AUTO: 0.1 10*3/MM3 (ref 0–0.2)
BASOPHILS NFR BLD AUTO: 0.6 % (ref 0–1.5)
BENZODIAZ UR QL SCN: NEGATIVE
BILIRUB SERPL-MCNC: 0.2 MG/DL (ref 0–1.2)
BUN SERPL-MCNC: 12 MG/DL (ref 6–20)
BUN/CREAT SERPL: 11.8 (ref 7–25)
CALCIUM SPEC-SCNC: 9.3 MG/DL (ref 8.6–10.5)
CANNABINOIDS SERPL QL: NEGATIVE
CHLORIDE SERPL-SCNC: 103 MMOL/L (ref 98–107)
CO2 SERPL-SCNC: 27 MMOL/L (ref 22–29)
COCAINE UR QL: NEGATIVE
CREAT SERPL-MCNC: 1.02 MG/DL (ref 0.76–1.27)
DEPRECATED RDW RBC AUTO: 44.2 FL (ref 37–54)
EGFRCR SERPLBLD CKD-EPI 2021: 100.8 ML/MIN/1.73
EOSINOPHIL # BLD AUTO: 0.3 10*3/MM3 (ref 0–0.4)
EOSINOPHIL NFR BLD AUTO: 3 % (ref 0.3–6.2)
ERYTHROCYTE [DISTWIDTH] IN BLOOD BY AUTOMATED COUNT: 14.3 % (ref 12.3–15.4)
ETHANOL UR QL: <0.01 %
GLOBULIN UR ELPH-MCNC: 2.7 GM/DL
GLUCOSE SERPL-MCNC: 116 MG/DL (ref 65–99)
HCT VFR BLD AUTO: 39.6 % (ref 37.5–51)
HGB BLD-MCNC: 13 G/DL (ref 13–17.7)
LYMPHOCYTES # BLD AUTO: 3.1 10*3/MM3 (ref 0.7–3.1)
LYMPHOCYTES NFR BLD AUTO: 31.6 % (ref 19.6–45.3)
MCH RBC QN AUTO: 27.4 PG (ref 26.6–33)
MCHC RBC AUTO-ENTMCNC: 32.7 G/DL (ref 31.5–35.7)
MCV RBC AUTO: 83.8 FL (ref 79–97)
METHADONE UR QL SCN: POSITIVE
MONOCYTES # BLD AUTO: 0.8 10*3/MM3 (ref 0.1–0.9)
MONOCYTES NFR BLD AUTO: 7.8 % (ref 5–12)
NEUTROPHILS NFR BLD AUTO: 5.5 10*3/MM3 (ref 1.7–7)
NEUTROPHILS NFR BLD AUTO: 57 % (ref 42.7–76)
NRBC BLD AUTO-RTO: 0.1 /100 WBC (ref 0–0.2)
OPIATES UR QL: NEGATIVE
OXYCODONE UR QL SCN: NEGATIVE
PLATELET # BLD AUTO: 189 10*3/MM3 (ref 140–450)
PMV BLD AUTO: 8.9 FL (ref 6–12)
POTASSIUM SERPL-SCNC: 4.1 MMOL/L (ref 3.5–5.2)
PROT SERPL-MCNC: 7.1 G/DL (ref 6–8.5)
RBC # BLD AUTO: 4.73 10*6/MM3 (ref 4.14–5.8)
SALICYLATES SERPL-MCNC: 0.6 MG/DL
SODIUM SERPL-SCNC: 141 MMOL/L (ref 136–145)
WBC NRBC COR # BLD: 9.7 10*3/MM3 (ref 3.4–10.8)

## 2023-05-09 PROCEDURE — G0378 HOSPITAL OBSERVATION PER HR: HCPCS

## 2023-05-09 PROCEDURE — 99284 EMERGENCY DEPT VISIT MOD MDM: CPT

## 2023-05-09 PROCEDURE — 80053 COMPREHEN METABOLIC PANEL: CPT | Performed by: NURSE PRACTITIONER

## 2023-05-09 PROCEDURE — 80307 DRUG TEST PRSMV CHEM ANLYZR: CPT | Performed by: NURSE PRACTITIONER

## 2023-05-09 PROCEDURE — 93005 ELECTROCARDIOGRAM TRACING: CPT | Performed by: EMERGENCY MEDICINE

## 2023-05-09 PROCEDURE — 82077 ASSAY SPEC XCP UR&BREATH IA: CPT | Performed by: NURSE PRACTITIONER

## 2023-05-09 PROCEDURE — 36415 COLL VENOUS BLD VENIPUNCTURE: CPT

## 2023-05-09 PROCEDURE — 80143 DRUG ASSAY ACETAMINOPHEN: CPT | Performed by: NURSE PRACTITIONER

## 2023-05-09 PROCEDURE — 96374 THER/PROPH/DIAG INJ IV PUSH: CPT

## 2023-05-09 PROCEDURE — 25010000002 LORAZEPAM PER 2 MG

## 2023-05-09 PROCEDURE — 85025 COMPLETE CBC W/AUTO DIFF WBC: CPT | Performed by: NURSE PRACTITIONER

## 2023-05-09 PROCEDURE — 80179 DRUG ASSAY SALICYLATE: CPT | Performed by: NURSE PRACTITIONER

## 2023-05-09 RX ORDER — SODIUM CHLORIDE 0.9 % (FLUSH) 0.9 %
10 SYRINGE (ML) INJECTION EVERY 12 HOURS SCHEDULED
Status: CANCELLED | OUTPATIENT
Start: 2023-05-09

## 2023-05-09 RX ORDER — LORAZEPAM 2 MG/ML
2 INJECTION INTRAMUSCULAR ONCE
Status: DISCONTINUED | OUTPATIENT
Start: 2023-05-09 | End: 2023-05-10 | Stop reason: HOSPADM

## 2023-05-09 RX ORDER — LORAZEPAM 2 MG/ML
2 INJECTION INTRAMUSCULAR ONCE
Status: COMPLETED | OUTPATIENT
Start: 2023-05-09 | End: 2023-05-09

## 2023-05-09 RX ORDER — LORAZEPAM 2 MG/ML
1 INJECTION INTRAMUSCULAR ONCE
Status: DISCONTINUED | OUTPATIENT
Start: 2023-05-09 | End: 2023-05-09

## 2023-05-09 RX ORDER — ACETAMINOPHEN 325 MG/1
650 TABLET ORAL EVERY 4 HOURS PRN
Status: CANCELLED | OUTPATIENT
Start: 2023-05-09

## 2023-05-09 RX ORDER — SODIUM CHLORIDE 0.9 % (FLUSH) 0.9 %
10 SYRINGE (ML) INJECTION AS NEEDED
Status: CANCELLED | OUTPATIENT
Start: 2023-05-09

## 2023-05-09 RX ORDER — CHOLECALCIFEROL (VITAMIN D3) 125 MCG
5 CAPSULE ORAL NIGHTLY PRN
Status: CANCELLED | OUTPATIENT
Start: 2023-05-09

## 2023-05-09 RX ORDER — LORAZEPAM 2 MG/ML
INJECTION INTRAMUSCULAR
Status: COMPLETED
Start: 2023-05-09 | End: 2023-05-09

## 2023-05-09 RX ORDER — ENOXAPARIN SODIUM 100 MG/ML
40 INJECTION SUBCUTANEOUS DAILY
Status: CANCELLED | OUTPATIENT
Start: 2023-05-09

## 2023-05-09 RX ORDER — SODIUM CHLORIDE 9 MG/ML
40 INJECTION, SOLUTION INTRAVENOUS AS NEEDED
Status: CANCELLED | OUTPATIENT
Start: 2023-05-09

## 2023-05-09 RX ORDER — ONDANSETRON 2 MG/ML
4 INJECTION INTRAMUSCULAR; INTRAVENOUS EVERY 6 HOURS PRN
Status: CANCELLED | OUTPATIENT
Start: 2023-05-09

## 2023-05-09 RX ADMIN — LORAZEPAM 2 MG: 2 INJECTION INTRAMUSCULAR at 20:39

## 2023-05-09 RX ADMIN — LORAZEPAM 2 MG: 2 INJECTION INTRAMUSCULAR; INTRAVENOUS at 20:39

## 2023-05-09 RX ADMIN — SODIUM CHLORIDE 1000 ML: 9 INJECTION, SOLUTION INTRAVENOUS at 20:47

## 2023-05-10 LAB — QT INTERVAL: 356 MS

## 2023-05-10 NOTE — ED PROVIDER NOTES
Subjective   History of Present Illness  Chief complaint: Overdose      Context: Patient is a 32-year-old male who comes in by EMS he reportedly took too many gabapentin.  Girlfriend called EMS stating he had a bottle of 800 mg gabapentin filled on May 4 with 90 tablets in the bottle is now empty.  She states she she believes he took them all before 5 PM.  She states he has done this before while he takes too many because his legs are hurting and then he has a seizure.  She states he is on the gabapentin for neuropathy.    Patient is somewhat drowsy on evaluation but provides additional history that he does not have any SI HI or hallucinations and was taking them because his legs hurt not because he was trying to hurt himself.  He denies any complaints other than feeling a little lightheaded.  He denies any alcohol use or taking any other medications        PCP: Korey zepeda             Review of Systems   Constitutional: Negative for fever.   Respiratory: Negative for shortness of breath.    Cardiovascular: Negative for chest pain.   Neurological: Positive for light-headedness.   Psychiatric/Behavioral: Negative for hallucinations, self-injury and suicidal ideas.       Past Medical History:   Diagnosis Date   • Arm pain        No Known Allergies    Past Surgical History:   Procedure Laterality Date   • DENTAL PROCEDURE     • TONSILLECTOMY         Family History   Problem Relation Age of Onset   • Diabetes Father        Social History     Socioeconomic History   • Marital status: Single   Tobacco Use   • Smoking status: Every Day     Packs/day: 1.00     Types: Cigarettes   • Smokeless tobacco: Never   Substance and Sexual Activity   • Drug use: No   • Sexual activity: Defer           Objective   Physical Exam     Vital signs and triage nurse note reviewed.  Constitutional: Awake, alert;   HEENT: Normocephalic, atraumatic; pupils are PERRL with intact EOM; oropharynx is pink and moist without exudate or  erythema.  Neck: Supple, full range of motion without pain;    Cardiovascular: mildly tachycardic Regular rate and rhythm, normal S1-S2.  Pulmonary: Respiratory effort regular nonlabored, breath sounds clear to auscultation all fields.  Abdomen: Soft, nontender nondistended with normoactive bowel sounds; no rebound or guarding.  Musculoskeletal: Independent range of motion of all extremities with no palpable tenderness or edema.  Neuro: Alert oriented x3, speech is clear and appropriate, GCS 15  Skin:  Fleshtone warm, dry, intact; no erythematous or petechial rash or lesion      Procedures           ED Course  ED Course as of 05/09/23 2313 Tue May 09, 2023   2045 Spoke with poison control   [JW]      ED Course User Index  [JW] Kirti Wilson APRN      Labs Reviewed   COMPREHENSIVE METABOLIC PANEL - Abnormal; Notable for the following components:       Result Value    Glucose 116 (*)     All other components within normal limits    Narrative:     GFR Normal >60  Chronic Kidney Disease <60  Kidney Failure <15     URINE DRUG SCREEN - Abnormal; Notable for the following components:    Methadone Screen, Urine Positive (*)     All other components within normal limits    Narrative:     Negative Thresholds Per Drugs Screened:    Amphetamines                 500 ng/ml  Barbiturates                 200 ng/ml  Benzodiazepines              100 ng/ml  Cocaine                      300 ng/ml  Methadone                    300 ng/ml  Opiates                      300 ng/ml  Oxycodone                    100 ng/ml  THC                           50 ng/ml    The Normal Value for all drugs tested is negative. This report includes final unconfirmed screening results to be used for medical treatment purposes only. Unconfirmed results must not be used for non-medical purposes such as employment or legal testing. Clinical consideration should be applied to any drug of abuse test, particularly when unconfirmed results are used.           All urine drugs of abuse requests without chain of custody are for medical screening purposes only.  False positives are possible.     SALICYLATE LEVEL - Normal   ACETAMINOPHEN LEVEL - Normal    Narrative:     Acetaminophen Therapeutic Range  5-20 ug/mL      Hours after ingestion            Toxic Value    4 Hours                           150 ug/mL    8 Hours                            70 ug/mL   12 Hours                            40 ug/mL   16 Hours                            20 ug/mL    These values apply to a single ingestion only.    CBC WITH AUTO DIFFERENTIAL - Normal   ETHANOL    Narrative:     Plasma Ethanol Clinical Symptoms:    ETOH (%)               Clinical Symptom  .01-.05              No apparent influence  .03-.12              Euphoria, Diminished judgment and attention   .09-.25              Impaired comprehension, Muscle incoordination  .18-.30              Confusion, Staggered gait, Slurred speech  .25-.40              Markedly decreased response to stimuli, unable to stand or                        walk, vomitting, sleep or stupor  .35-.50              Comatose, Anesthesia, Subnormal body temperature       CBC AND DIFFERENTIAL    Narrative:     The following orders were created for panel order CBC & Differential.  Procedure                               Abnormality         Status                     ---------                               -----------         ------                     CBC Auto Differential[[id number]]        Normal              Final result                 Please view results for these tests on the individual orders.     Medications   LORazepam (ATIVAN) injection 2 mg (2 mg Intravenous Not Given 5/9/23 2049)   sodium chloride 0.9 % bolus 1,000 mL (1,000 mL Intravenous New Bag 5/9/23 2047)   LORazepam (ATIVAN) injection 2 mg (2 mg Intravenous Given 5/9/23 2039)     No radiology results for the last day  Prior to Admission medications    Medication Sig Start Date End Date Taking?  "Authorizing Provider   albuterol sulfate  (90 Base) MCG/ACT inhaler Inhale 2 puffs Every 4 (Four) Hours As Needed for Wheezing. 7/17/21   Lucian Marie MD   ALPRAZolam (XANAX) 0.5 MG tablet TAKE 1 TABLET BY MOUTH TWICE A DAY AS NEEDED FOR ANXIETY 3/25/21   Alexander Rayo MD   gabapentin (NEURONTIN) 300 MG capsule Take 2 capsules by mouth 3 (Three) Times a Day. 3/8/21   Alexander Rayo MD   levETIRAcetam (KEPPRA) 500 MG tablet Take 1 tablet by mouth 2 (Two) Times a Day for 30 days. 7/25/22 8/24/22  Kristofer Torres PA   lidocaine (LIDODERM) 5 % Place 1 patch on the skin as directed by provider Daily. Remove & Discard patch within 12 hours or as directed by MD 8/4/22   Eneida Chamorro APRN   methocarbamol (ROBAXIN) 750 MG tablet Take 750 mg by mouth Daily. 11/25/19   ProviderLilo MD   methylPREDNISolone (MEDROL) 4 MG dose pack Take as directed on package instructions. 3/15/22   Ely Hairston PA   naproxen (EC NAPROSYN) 500 MG EC tablet Take 1 tablet by mouth 2 (Two) Times a Day As Needed for Mild Pain . 3/15/22   Ely Hairston PA   traMADol (ULTRAM) 50 MG tablet Take 1 tablet by mouth Every 6 (Six) Hours As Needed for Moderate Pain . 10/30/20   Alexander Rayo MD                                          Medical Decision Making  Inspect report: Patient last had 90 tablets of gabapentin filled on 5/4/2023    Chart review: 1/26/23: katelyn note b rund np: opiate dependence and peripheral neuropathy        /70 (BP Location: Right arm, Patient Position: Lying)   Pulse 112   Temp 98.2 °F (36.8 °C) (Oral)   Resp 18   Ht 175.3 cm (69\")   Wt 97.1 kg (214 lb)   SpO2 100%   BMI 31.60 kg/m²         Radiology interpretation: CT of the head not felt to be emergently warranted but considered  Lab interpretation:  Labs all viewed by me and significant for, drug screen positive for methadone, glucose 116, white blood cell count 9.7    EKG viewed and interpreted by me " and corroborated by Dr. James, sinus tachycardia rate of 110  comparison: 4/15/2021 sinus rhythm rate of 86            Appropriate PPE worn during exam.  Patient was placed on cardiac monitor and had an IV established labs EKG was obtained to evaluate for electrolyte abnormalities liver injury intoxication.  He was noted to have a brief witnessed seizure and was given dose of Ativan and placed on seizure precautions and recovered well without any aspiration event.  I discussed with poison control who recommended an 8-hour evaluation as long as he was hemodynamically stable and alert and oriented and could be discharged.  I discussed with Dr. James who also saw and evaluated the patient and he was placed in observation for repeat labs monitoring with likely discharge in the morning         i discussed findings with patient who voices understanding of placement observation  This document is intended for medical expert use only. Reading of this document by patients and/or patient's family without participating medical staff guidance may result in misinterpretation and unintended morbidity.  Any interpretation of such data is the responsibility of the patient and/or family member responsible for the patient in concert with their primary or specialist providers, not to be left for sources of online searches such as [employer], [employer] or similar queries. Relying on these approaches to knowledge may result in misinterpretation, misguided goals of care and even death should patients or family members try recommendations outside of the realm of professional medical care in a supervised inpatient environment.       Discussed with Dr. James    Overdose of undetermined intent, initial encounter: acute illness or injury  Seizure: acute illness or injury  Amount and/or Complexity of Data Reviewed  Labs: ordered.  ECG/medicine tests: ordered.      Risk  Prescription drug management.  Decision regarding hospitalization.          Final  diagnoses:   Overdose of undetermined intent, initial encounter   Seizure       ED Disposition  ED Disposition     ED Disposition   Decision to Admit    Condition   --    Comment   --             No follow-up provider specified.       Medication List      No changes were made to your prescriptions during this visit.          Kirti Wilson, APRN  05/09/23 6225

## 2023-05-10 NOTE — NURSING NOTE
La from posion control called.  Updated on pt's labs.  Requesting asa and tyl levels and ekg.  Will call ed dr for orders.

## 2023-05-10 NOTE — NURSING NOTE
"Pt is stating he is not staying.  Explained about him needing observation.  Discussed AMA with pt.  Pt's response is \"I\"m not staying here\"  Will call leeann barbosa  "

## 2023-05-10 NOTE — NURSING NOTE
CALLED ER ABOUT PT LEAVING AMA  LATONIA FOWLER DISCUSSED WITH DR SANCHEZ WHO VERIFIES IF PT IS ALERT AND ORIENTATED, HE IS ABLE TO CHOOSE TO LEAVE AMA.

## 2023-05-10 NOTE — NURSING NOTE
PT SIGNED THE AMA PW.  ENCOURAGED PT TO RETURN IF ANY ISSUES. PT'S SO STATES SHE WILL WATCH HIM CLOSE. STATES THEY LIVE ACROSS FROM THE HOSPITAL AND WILL BE EASY TO RETURN IF NEED TO.

## 2023-05-10 NOTE — CASE MANAGEMENT/SOCIAL WORK
Case Management Discharge Note      Final Note: home                 Transportation Services  Private: Car    Final Discharge Disposition Code: 07 - left AMA

## 2023-09-20 ENCOUNTER — HOSPITAL ENCOUNTER (OUTPATIENT)
Dept: GENERAL RADIOLOGY | Facility: HOSPITAL | Age: 33
Discharge: HOME | End: 2023-09-20
Admitting: INTERNAL MEDICINE
Payer: MEDICAID

## 2023-09-20 ENCOUNTER — TRANSCRIBE ORDERS (OUTPATIENT)
Dept: ADMINISTRATIVE | Facility: HOSPITAL | Age: 33
End: 2023-09-20
Payer: MEDICAID

## 2023-09-20 DIAGNOSIS — R76.11 NONSPECIFIC REACTION TO TUBERCULIN TEST: ICD-10-CM

## 2023-09-20 DIAGNOSIS — R76.11 NONSPECIFIC REACTION TO TUBERCULIN TEST: Primary | ICD-10-CM

## 2023-09-20 PROCEDURE — 71046 X-RAY EXAM CHEST 2 VIEWS: CPT

## 2023-12-27 ENCOUNTER — APPOINTMENT (OUTPATIENT)
Dept: GENERAL RADIOLOGY | Facility: HOSPITAL | Age: 33
End: 2023-12-27
Payer: MEDICAID

## 2023-12-27 ENCOUNTER — APPOINTMENT (OUTPATIENT)
Dept: CT IMAGING | Facility: HOSPITAL | Age: 33
End: 2023-12-27
Payer: MEDICAID

## 2023-12-27 ENCOUNTER — HOSPITAL ENCOUNTER (EMERGENCY)
Facility: HOSPITAL | Age: 33
Discharge: HOME | End: 2023-12-27
Attending: EMERGENCY MEDICINE | Admitting: EMERGENCY MEDICINE
Payer: MEDICAID

## 2023-12-27 VITALS
RESPIRATION RATE: 16 BRPM | TEMPERATURE: 97.4 F | BODY MASS INDEX: 35.55 KG/M2 | HEIGHT: 69 IN | OXYGEN SATURATION: 92 % | HEART RATE: 80 BPM | DIASTOLIC BLOOD PRESSURE: 84 MMHG | WEIGHT: 240 LBS | SYSTOLIC BLOOD PRESSURE: 125 MMHG

## 2023-12-27 DIAGNOSIS — R56.9 SEIZURE: Primary | ICD-10-CM

## 2023-12-27 DIAGNOSIS — J06.9 UPPER RESPIRATORY TRACT INFECTION, UNSPECIFIED TYPE: ICD-10-CM

## 2023-12-27 LAB
ANION GAP SERPL CALCULATED.3IONS-SCNC: 10 MMOL/L (ref 5–15)
B PARAPERT DNA SPEC QL NAA+PROBE: NOT DETECTED
B PERT DNA SPEC QL NAA+PROBE: NOT DETECTED
BASOPHILS # BLD MANUAL: 0.15 10*3/MM3 (ref 0–0.2)
BASOPHILS NFR BLD MANUAL: 1 % (ref 0–1.5)
BUN SERPL-MCNC: 9 MG/DL (ref 6–20)
BUN/CREAT SERPL: 8.9 (ref 7–25)
C PNEUM DNA NPH QL NAA+NON-PROBE: NOT DETECTED
CALCIUM SPEC-SCNC: 9 MG/DL (ref 8.6–10.5)
CHLORIDE SERPL-SCNC: 99 MMOL/L (ref 98–107)
CO2 SERPL-SCNC: 28 MMOL/L (ref 22–29)
CREAT SERPL-MCNC: 1.01 MG/DL (ref 0.76–1.27)
DEPRECATED RDW RBC AUTO: 42 FL (ref 37–54)
EGFRCR SERPLBLD CKD-EPI 2021: 101.3 ML/MIN/1.73
EOSINOPHIL # BLD MANUAL: 0.15 10*3/MM3 (ref 0–0.4)
EOSINOPHIL NFR BLD MANUAL: 1 % (ref 0.3–6.2)
ERYTHROCYTE [DISTWIDTH] IN BLOOD BY AUTOMATED COUNT: 14.3 % (ref 12.3–15.4)
FLUAV SUBTYP SPEC NAA+PROBE: NOT DETECTED
FLUBV RNA ISLT QL NAA+PROBE: NOT DETECTED
GLUCOSE SERPL-MCNC: 77 MG/DL (ref 65–99)
HADV DNA SPEC NAA+PROBE: NOT DETECTED
HCOV 229E RNA SPEC QL NAA+PROBE: NOT DETECTED
HCOV HKU1 RNA SPEC QL NAA+PROBE: NOT DETECTED
HCOV NL63 RNA SPEC QL NAA+PROBE: NOT DETECTED
HCOV OC43 RNA SPEC QL NAA+PROBE: NOT DETECTED
HCT VFR BLD AUTO: 42.8 % (ref 37.5–51)
HGB BLD-MCNC: 13.7 G/DL (ref 13–17.7)
HMPV RNA NPH QL NAA+NON-PROBE: NOT DETECTED
HPIV1 RNA ISLT QL NAA+PROBE: NOT DETECTED
HPIV2 RNA SPEC QL NAA+PROBE: NOT DETECTED
HPIV3 RNA NPH QL NAA+PROBE: NOT DETECTED
HPIV4 P GENE NPH QL NAA+PROBE: NOT DETECTED
LYMPHOCYTES # BLD MANUAL: 5.96 10*3/MM3 (ref 0.7–3.1)
LYMPHOCYTES NFR BLD MANUAL: 7 % (ref 5–12)
M PNEUMO IGG SER IA-ACNC: NOT DETECTED
MCH RBC QN AUTO: 27.3 PG (ref 26.6–33)
MCHC RBC AUTO-ENTMCNC: 32.1 G/DL (ref 31.5–35.7)
MCV RBC AUTO: 85.2 FL (ref 79–97)
MONOCYTES # BLD: 1.04 10*3/MM3 (ref 0.1–0.9)
NEUTROPHILS # BLD AUTO: 7.6 10*3/MM3 (ref 1.7–7)
NEUTROPHILS NFR BLD MANUAL: 51 % (ref 42.7–76)
PLAT MORPH BLD: NORMAL
PLATELET # BLD AUTO: 262 10*3/MM3 (ref 140–450)
PMV BLD AUTO: 8.2 FL (ref 6–12)
POTASSIUM SERPL-SCNC: 3.9 MMOL/L (ref 3.5–5.2)
RBC # BLD AUTO: 5.03 10*6/MM3 (ref 4.14–5.8)
RBC MORPH BLD: NORMAL
RHINOVIRUS RNA SPEC NAA+PROBE: NOT DETECTED
RSV RNA NPH QL NAA+NON-PROBE: NOT DETECTED
SARS-COV-2 RNA NPH QL NAA+NON-PROBE: NOT DETECTED
SCAN SLIDE: NORMAL
SODIUM SERPL-SCNC: 137 MMOL/L (ref 136–145)
VARIANT LYMPHS NFR BLD MANUAL: 40 % (ref 19.6–45.3)
WBC MORPH BLD: NORMAL
WBC NRBC COR # BLD AUTO: 14.9 10*3/MM3 (ref 3.4–10.8)

## 2023-12-27 PROCEDURE — 71045 X-RAY EXAM CHEST 1 VIEW: CPT

## 2023-12-27 PROCEDURE — 85025 COMPLETE CBC W/AUTO DIFF WBC: CPT | Performed by: EMERGENCY MEDICINE

## 2023-12-27 PROCEDURE — 70450 CT HEAD/BRAIN W/O DYE: CPT

## 2023-12-27 PROCEDURE — 99284 EMERGENCY DEPT VISIT MOD MDM: CPT

## 2023-12-27 PROCEDURE — 80048 BASIC METABOLIC PNL TOTAL CA: CPT | Performed by: EMERGENCY MEDICINE

## 2023-12-27 PROCEDURE — 85007 BL SMEAR W/DIFF WBC COUNT: CPT | Performed by: EMERGENCY MEDICINE

## 2023-12-27 PROCEDURE — 0202U NFCT DS 22 TRGT SARS-COV-2: CPT | Performed by: EMERGENCY MEDICINE

## 2023-12-27 RX ORDER — SODIUM CHLORIDE 0.9 % (FLUSH) 0.9 %
10 SYRINGE (ML) INJECTION AS NEEDED
Status: DISCONTINUED | OUTPATIENT
Start: 2023-12-27 | End: 2023-12-27 | Stop reason: HOSPADM

## 2023-12-27 NOTE — ED PROVIDER NOTES
"Subjective   History of Present Illness  Chief complaint: Seizure    33-year-old male presents after a seizure.  Patient states he has had seizures in the past but they have always been provoked seizures.  He states he has not been diagnosed with epilepsy.  He states he is on methadone and today he also took some tramadol and he thinks that is what caused his seizure.  Witness reports seizure lasted about 1 minute.  He did hit his head on the concrete.  Patient is now awake and alert with no neurologic complaints.  He also reports he has had cough and congestion for the last several days.  He states he was treated with a steroid and antibiotic by his primary doctor but he is not feeling any better.    History provided by:  Patient and relative      Review of Systems   Constitutional:  Negative for fever.   HENT:  Positive for congestion.    Respiratory:  Positive for cough. Negative for shortness of breath.    Cardiovascular:  Negative for chest pain.   Gastrointestinal:  Negative for abdominal pain.   Neurological:  Positive for seizures. Negative for headaches.   Psychiatric/Behavioral:  Negative for confusion.        Past Medical History:   Diagnosis Date    Arm pain        No Known Allergies    Past Surgical History:   Procedure Laterality Date    DENTAL PROCEDURE      TONSILLECTOMY         Family History   Problem Relation Age of Onset    Diabetes Father        Social History     Socioeconomic History    Marital status: Single   Tobacco Use    Smoking status: Every Day     Packs/day: 1     Types: Cigarettes    Smokeless tobacco: Never   Substance and Sexual Activity    Drug use: No    Sexual activity: Defer       /84   Pulse 80   Temp 97.4 °F (36.3 °C) (Oral)   Resp 16   Ht 175.3 cm (69\")   Wt 109 kg (240 lb)   SpO2 92%   BMI 35.44 kg/m²       Objective   Physical Exam  Vitals and nursing note reviewed.   Constitutional:       Appearance: Normal appearance.   HENT:      Head: Normocephalic.      " Comments: Mild abrasion to the left side of the forehead     Mouth/Throat:      Mouth: Mucous membranes are moist.   Cardiovascular:      Rate and Rhythm: Normal rate and regular rhythm.      Heart sounds: Normal heart sounds.   Pulmonary:      Effort: Pulmonary effort is normal. No respiratory distress.      Breath sounds: Normal breath sounds.      Comments: Frequent coughing  Abdominal:      Palpations: Abdomen is soft.      Tenderness: There is no abdominal tenderness.   Musculoskeletal:         General: No deformity or signs of injury.      Cervical back: No tenderness.   Skin:     General: Skin is warm and dry.   Neurological:      General: No focal deficit present.      Mental Status: He is alert and oriented to person, place, and time.         Procedures           ED Course      Results for orders placed or performed during the hospital encounter of 12/27/23   Respiratory Panel PCR w/COVID-19(SARS-CoV-2) GERALDO/ANDRES/STACI/PAD/COR/EMILE In-House, NP Swab in UTM/VTM, 2 HR TAT - Swab, Nasopharynx    Specimen: Nasopharynx; Swab   Result Value Ref Range    ADENOVIRUS, PCR Not Detected Not Detected    Coronavirus 229E Not Detected Not Detected    Coronavirus HKU1 Not Detected Not Detected    Coronavirus NL63 Not Detected Not Detected    Coronavirus OC43 Not Detected Not Detected    COVID19 Not Detected Not Detected - Ref. Range    Human Metapneumovirus Not Detected Not Detected    Human Rhinovirus/Enterovirus Not Detected Not Detected    Influenza A PCR Not Detected Not Detected    Influenza B PCR Not Detected Not Detected    Parainfluenza Virus 1 Not Detected Not Detected    Parainfluenza Virus 2 Not Detected Not Detected    Parainfluenza Virus 3 Not Detected Not Detected    Parainfluenza Virus 4 Not Detected Not Detected    RSV, PCR Not Detected Not Detected    Bordetella pertussis pcr Not Detected Not Detected    Bordetella parapertussis PCR Not Detected Not Detected    Chlamydophila pneumoniae PCR Not Detected Not  Detected    Mycoplasma pneumo by PCR Not Detected Not Detected   Basic Metabolic Panel    Specimen: Blood   Result Value Ref Range    Glucose 77 65 - 99 mg/dL    BUN 9 6 - 20 mg/dL    Creatinine 1.01 0.76 - 1.27 mg/dL    Sodium 137 136 - 145 mmol/L    Potassium 3.9 3.5 - 5.2 mmol/L    Chloride 99 98 - 107 mmol/L    CO2 28.0 22.0 - 29.0 mmol/L    Calcium 9.0 8.6 - 10.5 mg/dL    BUN/Creatinine Ratio 8.9 7.0 - 25.0    Anion Gap 10.0 5.0 - 15.0 mmol/L    eGFR 101.3 >60.0 mL/min/1.73   CBC Auto Differential    Specimen: Blood   Result Value Ref Range    WBC 14.90 (H) 3.40 - 10.80 10*3/mm3    RBC 5.03 4.14 - 5.80 10*6/mm3    Hemoglobin 13.7 13.0 - 17.7 g/dL    Hematocrit 42.8 37.5 - 51.0 %    MCV 85.2 79.0 - 97.0 fL    MCH 27.3 26.6 - 33.0 pg    MCHC 32.1 31.5 - 35.7 g/dL    RDW 14.3 12.3 - 15.4 %    RDW-SD 42.0 37.0 - 54.0 fl    MPV 8.2 6.0 - 12.0 fL    Platelets 262 140 - 450 10*3/mm3   Scan Slide    Specimen: Blood   Result Value Ref Range    Scan Slide     Manual Differential    Specimen: Blood   Result Value Ref Range    Neutrophil % 51.0 42.7 - 76.0 %    Lymphocyte % 40.0 19.6 - 45.3 %    Monocyte % 7.0 5.0 - 12.0 %    Eosinophil % 1.0 0.3 - 6.2 %    Basophil % 1.0 0.0 - 1.5 %    Neutrophils Absolute 7.60 (H) 1.70 - 7.00 10*3/mm3    Lymphocytes Absolute 5.96 (H) 0.70 - 3.10 10*3/mm3    Monocytes Absolute 1.04 (H) 0.10 - 0.90 10*3/mm3    Eosinophils Absolute 0.15 0.00 - 0.40 10*3/mm3    Basophils Absolute 0.15 0.00 - 0.20 10*3/mm3    RBC Morphology Normal Normal    WBC Morphology Normal Normal    Platelet Morphology Normal Normal     CT Head Without Contrast    Result Date: 12/27/2023  Impression: 1. No acute intracranial abnormality identified by noncontrast CT. 2. Paranasal sinus disease as discussed above. Electronically Signed: Man Aleman MD  12/27/2023 3:03 PM EST  Workstation ID: [id number]    XR Chest 1 View    Result Date: 12/27/2023  Impression: No active disease. Electronically Signed: Akhil Wang MD   12/27/2023 2:53 PM EST  Workstation ID: [id number]                                          Medical Decision Making  Amount and/or Complexity of Data Reviewed  Labs: ordered.  Radiology: ordered.    Risk  Prescription drug management.      Patient had the above evaluation.  Results were discussed with the patient.  CT head shows no acute intracranial abnormality.  Chest x-ray shows no acute disease.  White blood cell count is mildly elevated at 14.9.  Patient is afebrile.  BMP is unremarkable.  Respiratory panel is negative.  Patient remained well-appearing in the emergency room.  He will be discharged to follow-up with his primary doctor.      Final diagnoses:   Seizure   Upper respiratory tract infection, unspecified type       ED Disposition  ED Disposition       ED Disposition   Discharge    Condition   Stable    Comment   --               Korey Funes, FNP  [address] [facility] [place] [facility]  [phone]    Call in 2 days           Medication List      No changes were made to your prescriptions during this visit.            Olaf Marie MD  12/27/23 7015

## 2023-12-27 NOTE — ED NOTES
Pt and significant other report pt had a seizure outside today where he fell onto his face on the concrete. Pt began foaming at the mouth and shaking. States he bit his tongue. Pt has had about 6 seizures in the past that are caused by mixing methadone with other medications. Today's was after using tramadol + methadone. Pt also has been being treated for pneumonia (rib pain from coughing).   Pt is in bed at the lowest setting with rails padded.

## 2024-02-29 ENCOUNTER — APPOINTMENT (OUTPATIENT)
Dept: GENERAL RADIOLOGY | Facility: HOSPITAL | Age: 34
End: 2024-02-29
Payer: MEDICAID

## 2024-02-29 ENCOUNTER — HOSPITAL ENCOUNTER (EMERGENCY)
Facility: HOSPITAL | Age: 34
Discharge: HOME | End: 2024-02-29
Payer: MEDICAID

## 2024-02-29 VITALS
DIASTOLIC BLOOD PRESSURE: 81 MMHG | BODY MASS INDEX: 34.07 KG/M2 | SYSTOLIC BLOOD PRESSURE: 127 MMHG | HEART RATE: 75 BPM | HEIGHT: 69 IN | WEIGHT: 230 LBS | RESPIRATION RATE: 16 BRPM | OXYGEN SATURATION: 96 % | TEMPERATURE: 98.9 F

## 2024-02-29 DIAGNOSIS — R56.9 SEIZURE: Primary | ICD-10-CM

## 2024-02-29 LAB
ANION GAP SERPL CALCULATED.3IONS-SCNC: 16 MMOL/L (ref 5–15)
ANISOCYTOSIS BLD QL: ABNORMAL
BUN SERPL-MCNC: 14 MG/DL (ref 6–20)
BUN/CREAT SERPL: 14 (ref 7–25)
CALCIUM SPEC-SCNC: 9.4 MG/DL (ref 8.6–10.5)
CHLORIDE SERPL-SCNC: 98 MMOL/L (ref 98–107)
CO2 SERPL-SCNC: 23 MMOL/L (ref 22–29)
CREAT SERPL-MCNC: 1 MG/DL (ref 0.76–1.27)
DEPRECATED RDW RBC AUTO: 44.6 FL (ref 37–54)
EGFRCR SERPLBLD CKD-EPI 2021: 102.6 ML/MIN/1.73
EOSINOPHIL # BLD MANUAL: 0.51 10*3/MM3 (ref 0–0.4)
EOSINOPHIL NFR BLD MANUAL: 3 % (ref 0.3–6.2)
ERYTHROCYTE [DISTWIDTH] IN BLOOD BY AUTOMATED COUNT: 15.2 % (ref 12.3–15.4)
GLUCOSE BLDC GLUCOMTR-MCNC: 104 MG/DL (ref 70–105)
GLUCOSE SERPL-MCNC: 83 MG/DL (ref 65–99)
HCT VFR BLD AUTO: 43.9 % (ref 37.5–51)
HGB BLD-MCNC: 13.7 G/DL (ref 13–17.7)
HOLD SPECIMEN: NORMAL
LYMPHOCYTES # BLD MANUAL: 3.04 10*3/MM3 (ref 0.7–3.1)
LYMPHOCYTES NFR BLD MANUAL: 7 % (ref 5–12)
MCH RBC QN AUTO: 26.4 PG (ref 26.6–33)
MCHC RBC AUTO-ENTMCNC: 31.2 G/DL (ref 31.5–35.7)
MCV RBC AUTO: 84.7 FL (ref 79–97)
MONOCYTES # BLD: 1.18 10*3/MM3 (ref 0.1–0.9)
NEUTROPHILS # BLD AUTO: 12.17 10*3/MM3 (ref 1.7–7)
NEUTROPHILS NFR BLD MANUAL: 72 % (ref 42.7–76)
PLAT MORPH BLD: NORMAL
PLATELET # BLD AUTO: 263 10*3/MM3 (ref 140–450)
PMV BLD AUTO: 8.2 FL (ref 6–12)
POIKILOCYTOSIS BLD QL SMEAR: ABNORMAL
POTASSIUM SERPL-SCNC: 3.7 MMOL/L (ref 3.5–5.2)
RBC # BLD AUTO: 5.19 10*6/MM3 (ref 4.14–5.8)
SCAN SLIDE: NORMAL
SODIUM SERPL-SCNC: 137 MMOL/L (ref 136–145)
VARIANT LYMPHS NFR BLD MANUAL: 18 % (ref 19.6–45.3)
WBC MORPH BLD: NORMAL
WBC NRBC COR # BLD AUTO: 16.9 10*3/MM3 (ref 3.4–10.8)
WHOLE BLOOD HOLD COAG: NORMAL
WHOLE BLOOD HOLD SPECIMEN: NORMAL

## 2024-02-29 PROCEDURE — 71045 X-RAY EXAM CHEST 1 VIEW: CPT

## 2024-02-29 PROCEDURE — 82948 REAGENT STRIP/BLOOD GLUCOSE: CPT

## 2024-02-29 PROCEDURE — 96374 THER/PROPH/DIAG INJ IV PUSH: CPT

## 2024-02-29 PROCEDURE — 99283 EMERGENCY DEPT VISIT LOW MDM: CPT

## 2024-02-29 PROCEDURE — 80048 BASIC METABOLIC PNL TOTAL CA: CPT | Performed by: NURSE PRACTITIONER

## 2024-02-29 PROCEDURE — 85025 COMPLETE CBC W/AUTO DIFF WBC: CPT | Performed by: NURSE PRACTITIONER

## 2024-02-29 PROCEDURE — 85007 BL SMEAR W/DIFF WBC COUNT: CPT | Performed by: NURSE PRACTITIONER

## 2024-02-29 PROCEDURE — 25010000002 LEVETRIRACETAM PER 10 MG: Performed by: NURSE PRACTITIONER

## 2024-02-29 RX ORDER — SODIUM CHLORIDE 0.9 % (FLUSH) 0.9 %
10 SYRINGE (ML) INJECTION AS NEEDED
Status: DISCONTINUED | OUTPATIENT
Start: 2024-02-29 | End: 2024-02-29 | Stop reason: HOSPADM

## 2024-02-29 RX ORDER — LEVETIRACETAM 500 MG/5ML
1000 INJECTION, SOLUTION, CONCENTRATE INTRAVENOUS ONCE
Status: COMPLETED | OUTPATIENT
Start: 2024-02-29 | End: 2024-02-29

## 2024-02-29 RX ORDER — LEVETIRACETAM 500 MG/1
500 TABLET ORAL 2 TIMES DAILY
Qty: 60 TABLET | Refills: 0 | Status: SHIPPED | OUTPATIENT
Start: 2024-02-29 | End: 2024-03-30

## 2024-02-29 RX ADMIN — LEVETIRACETAM 1000 MG: 100 INJECTION INTRAVENOUS at 17:49

## 2024-03-01 NOTE — DISCHARGE INSTRUCTIONS
Do not drive or operate any heavy machinery until cleared by your family doctor or neurology.  Start your Keppra.  Return for any new or worsening symptoms

## 2024-03-01 NOTE — ED PROVIDER NOTES
Provided the pt with crackers and ice chips   Encourage small frequent bites with ice chips between      Alvin Bass RN  01/28/18 3574 Subjective   History of Present Illness  Chief complaint: Seizure      Context: 33-year-old male presents via EMS after he reportedly had a seizure today.  He states he did bite his tongue but denies any incontinence or any other injuries from the incident.  He states he has not been taking his prescribed Keppra in approximately a year.        PCP:  [facility]        Review of Systems   Constitutional:  Negative for fever.       Past Medical History:   Diagnosis Date    Arm pain        No Known Allergies    Past Surgical History:   Procedure Laterality Date    DENTAL PROCEDURE      TONSILLECTOMY         Family History   Problem Relation Age of Onset    Diabetes Father        Social History     Socioeconomic History    Marital status: Single   Tobacco Use    Smoking status: Every Day     Packs/day: 1     Types: Cigarettes    Smokeless tobacco: Never   Substance and Sexual Activity    Alcohol use: Never    Drug use: No    Sexual activity: Defer           Objective   Physical Exam    Vital signs and triage nurse note reviewed.  Constitutional: Awake, alert; well-developed and well-nourished.  Mildly drowsy  HEENT: Normocephalic, atraumatic; pupils are PERRL with intact EOM; oropharynx is pink and moist no active bleeding  Neck: Supple, full range of motion without pain;    Cardiovascular: Regular rate and rhythm, normal S1-S2.  Pulmonary: Respiratory effort regular nonlabored, breath sounds clear to auscultation all fields.  Musculoskeletal: Independent range of motion of all extremities with no palpable tenderness or edema.  Neuro: Alert oriented x3, speech is clear and appropriate, GCS 15  Skin:  Fleshtone warm, dry, intact; no erythematous or petechial rash or lesion      Procedures           ED Course                                   Labs Reviewed   BASIC METABOLIC PANEL - Abnormal; Notable for the following components:       Result Value    Anion Gap 16.0 (*)     All other components within normal limits    Narrative:      GFR Normal >60  Chronic Kidney Disease <60  Kidney Failure <15     CBC WITH AUTO DIFFERENTIAL - Abnormal; Notable for the following components:    WBC 16.90 (*)     MCH 26.4 (*)     MCHC 31.2 (*)     All other components within normal limits   MANUAL DIFFERENTIAL - Abnormal; Notable for the following components:    Lymphocyte % 18.0 (*)     Neutrophils Absolute 12.17 (*)     Monocytes Absolute 1.18 (*)     Eosinophils Absolute 0.51 (*)     All other components within normal limits   POCT GLUCOSE FINGERSTICK - Normal   RAINBOW DRAW    Narrative:     The following orders were created for panel order [place] Draw.  Procedure                               Abnormality         Status                     ---------                               -----------         ------                     Green Top (Gel)[[id number]]                                                             Lavender Top[[id number]]                                     Final result               Gold Top - SST[[id number]]                                   Final result               Light Blue Top[[id number]]                                   Final result                 Please view results for these tests on the individual orders.   SCAN SLIDE   LAVENDER TOP   GOLD TOP - SST   LIGHT BLUE TOP   CBC AND DIFFERENTIAL    Narrative:     The following orders were created for panel order CBC & Differential.  Procedure                               Abnormality         Status                     ---------                               -----------         ------                     CBC Auto Differential[[id number]]        Abnormal            Final result               Scan Slide[[id number]]                                       Final result                 Please view results for these tests on the individual orders.     Medications   sodium chloride 0.9 % flush 10 mL (has no administration in time range)   sodium chloride 0.9 % flush 10 mL (has no administration in  "time range)   levETIRAcetam (KEPPRA) injection 1,000 mg (1,000 mg Intravenous Given 2/29/24 1749)     XR Chest 1 View    Result Date: 2/29/2024  Impression: No acute cardiopulmonary abnormality. Electronically Signed: Juarez Goodman MD  2/29/2024 6:13 PM EST  Workstation ID: [id number]   Prior to Admission medications    Medication Sig Start Date End Date Taking? Authorizing Provider   levETIRAcetam (KEPPRA) 500 MG tablet Take 1 tablet by mouth 2 (Two) Times a Day for 30 days. 2/29/24 3/30/24 Yes Kirti Wilson APRN   albuterol sulfate  (90 Base) MCG/ACT inhaler Inhale 2 puffs Every 4 (Four) Hours As Needed for Wheezing. 7/17/21   Lucian Marie MD   ALPRAZolam (XANAX) 0.5 MG tablet TAKE 1 TABLET BY MOUTH TWICE A DAY AS NEEDED FOR ANXIETY 3/25/21   Alexander Rayo MD   gabapentin (NEURONTIN) 300 MG capsule Take 2 capsules by mouth 3 (Three) Times a Day. 3/8/21   Alexander Rayo MD   traMADol (ULTRAM) 50 MG tablet Take 1 tablet by mouth Every 6 (Six) Hours As Needed for Moderate Pain . 10/30/20   Alexander Rayo MD   levETIRAcetam (KEPPRA) 500 MG tablet Take 1 tablet by mouth 2 (Two) Times a Day for 30 days. 7/25/22 2/29/24  Kristofer Torres PA   lidocaine (LIDODERM) 5 % Place 1 patch on the skin as directed by provider Daily. Remove & Discard patch within 12 hours or as directed by MD 8/4/22 2/29/24  Eneida Chamorro APRN   methocarbamol (ROBAXIN) 750 MG tablet Take 750 mg by mouth Daily. 11/25/19 2/29/24  ProviderLilo MD   methylPREDNISolone (MEDROL) 4 MG dose pack Take as directed on package instructions. 3/15/22 2/29/24  Ely Hairston PA   naproxen (EC NAPROSYN) 500 MG EC tablet Take 1 tablet by mouth 2 (Two) Times a Day As Needed for Mild Pain . 3/15/22 2/29/24  Ely Hairston PA               Medical Decision Making      /81 (BP Location: Right arm, Patient Position: Sitting)   Pulse 73   Temp 98.7 °F (37.1 °C) (Oral)   Resp 15   Ht 175.3 cm (69\")   Wt " 104 kg (230 lb)   SpO2 96%   BMI 33.97 kg/m²      Inspect review: Patient gets routine fills of gabapentin and Adderall  Chart review: December 2023 your evaluation for seizure    Radiology interpretation:  X-rays reviewed independently and interpreted by me: No focal infiltrates  Further interpretation by radiologist as above  Lab interpretation:  Labs all viewed by me and significant for,  white count 16 POC glucose   104         Appropriate PPE worn during exam.  Patient was placed in seizure precautions had an IV established.  He was given dose of Keppra.  He states he recently had pneumonia and did not have any residual cough.  .  His elevated white count was felt to be reactive to seizure as he has no infectious symptoms and will be referred back to his family doctor for monitoring.  Significant other is now at the bedside and states he has nonepileptic seizures when he mixes his methadone tramadol Adderall and gabapentin.  From his inspect report he is not prescribed tramadol.   He has had no seizure-like activity throughout his 3-hour ER evaluation.  He will be given a prescription for Keppra and was instructed no driving or operating heavy machinery.  Discussed he needs close follow-up with PCP or neurology      i discussed findings with patient and significant other who voices understanding of discharge instructions, signs and symptoms requiring return to ED; discharged improved and in stable condition with follow up for re-evaluation.  This document is intended for medical expert use only. Reading of this document by patients and/or patient's family without participating medical staff guidance may result in misinterpretation and unintended morbidity.  Any interpretation of such data is the responsibility of the patient and/or family member responsible for the patient in concert with their primary or specialist providers, not to be left for sources of online searches such as [employer], [employer] or  similar queries. Relying on these approaches to knowledge may result in misinterpretation, misguided goals of care and even death should patients or family members try recommendations outside of the realm of professional medical care in a supervised inpatient environment.         Problems Addressed:  Seizure: complicated acute illness or injury    Amount and/or Complexity of Data Reviewed  Labs: ordered.  Radiology: ordered.    Risk  Prescription drug management.        Final diagnoses:   Seizure       ED Disposition  ED Disposition       ED Disposition   Discharge    Condition   Stable    Comment   --               Korey Funes, FNP  [address] [facility] [place] [address] [phone]    Schedule an appointment as soon as possible for a visit            Medication List        Stop      lidocaine 5 %  Commonly known as: LIDODERM     methocarbamol 750 MG tablet  Commonly known as: ROBAXIN     methylPREDNISolone 4 MG dose pack  Commonly known as: MEDROL     naproxen 500 MG EC tablet  Commonly known as: EC NAPROSYN               Where to Get Your Medications        These medications were sent to [facility]/pharmacy #[id number] - [place], [place] - [address] [place] [address] [phone] [place] [phone]   [address] [facility] [place] [address]      Phone: [phone]   levETIRAcetam 500 MG tablet            Kirti Wilson, APRN  02/29/24 2027

## 2024-08-12 ENCOUNTER — APPOINTMENT (OUTPATIENT)
Dept: CT IMAGING | Facility: HOSPITAL | Age: 34
End: 2024-08-12
Payer: MEDICAID

## 2024-08-12 ENCOUNTER — HOSPITAL ENCOUNTER (EMERGENCY)
Facility: HOSPITAL | Age: 34
Discharge: HOME | End: 2024-08-12
Payer: MEDICAID

## 2024-08-12 VITALS
RESPIRATION RATE: 15 BRPM | DIASTOLIC BLOOD PRESSURE: 84 MMHG | SYSTOLIC BLOOD PRESSURE: 124 MMHG | WEIGHT: 199.08 LBS | OXYGEN SATURATION: 100 % | HEART RATE: 67 BPM | TEMPERATURE: 98.1 F | BODY MASS INDEX: 31.25 KG/M2 | HEIGHT: 67 IN

## 2024-08-12 DIAGNOSIS — R51.9 NONINTRACTABLE HEADACHE, UNSPECIFIED CHRONICITY PATTERN, UNSPECIFIED HEADACHE TYPE: Primary | ICD-10-CM

## 2024-08-12 DIAGNOSIS — R10.84 GENERALIZED ABDOMINAL PAIN: ICD-10-CM

## 2024-08-12 LAB
ALBUMIN SERPL-MCNC: 4.7 G/DL (ref 3.5–5.2)
ALBUMIN/GLOB SERPL: 1.7 G/DL
ALP SERPL-CCNC: 113 U/L (ref 39–117)
ALT SERPL W P-5'-P-CCNC: 20 U/L (ref 1–41)
AMPHET+METHAMPHET UR QL: POSITIVE
ANION GAP SERPL CALCULATED.3IONS-SCNC: 10 MMOL/L (ref 5–15)
AST SERPL-CCNC: 26 U/L (ref 1–40)
BACTERIA UR QL AUTO: ABNORMAL /HPF
BARBITURATES UR QL SCN: NEGATIVE
BASOPHILS # BLD AUTO: 0.06 10*3/MM3 (ref 0–0.2)
BASOPHILS NFR BLD AUTO: 0.7 % (ref 0–1.5)
BENZODIAZ UR QL SCN: POSITIVE
BILIRUB SERPL-MCNC: 0.4 MG/DL (ref 0–1.2)
BILIRUB UR QL STRIP: NEGATIVE
BUN SERPL-MCNC: 11 MG/DL (ref 6–20)
BUN/CREAT SERPL: 12 (ref 7–25)
CALCIUM SPEC-SCNC: 9.7 MG/DL (ref 8.6–10.5)
CANNABINOIDS SERPL QL: POSITIVE
CHLORIDE SERPL-SCNC: 103 MMOL/L (ref 98–107)
CLARITY UR: CLEAR
CO2 SERPL-SCNC: 28 MMOL/L (ref 22–29)
COCAINE UR QL: NEGATIVE
COLOR UR: YELLOW
CREAT SERPL-MCNC: 0.92 MG/DL (ref 0.76–1.27)
DEPRECATED RDW RBC AUTO: 41.3 FL (ref 37–54)
EGFRCR SERPLBLD CKD-EPI 2021: 113.3 ML/MIN/1.73
EOSINOPHIL # BLD AUTO: 0.18 10*3/MM3 (ref 0–0.4)
EOSINOPHIL NFR BLD AUTO: 2.1 % (ref 0.3–6.2)
ERYTHROCYTE [DISTWIDTH] IN BLOOD BY AUTOMATED COUNT: 13.4 % (ref 12.3–15.4)
ETHANOL UR QL: <0.01 %
FLUAV SUBTYP SPEC NAA+PROBE: NOT DETECTED
FLUBV RNA ISLT QL NAA+PROBE: NOT DETECTED
GLOBULIN UR ELPH-MCNC: 2.7 GM/DL
GLUCOSE SERPL-MCNC: 89 MG/DL (ref 65–99)
GLUCOSE UR STRIP-MCNC: NEGATIVE MG/DL
HCT VFR BLD AUTO: 45.3 % (ref 37.5–51)
HGB BLD-MCNC: 14.8 G/DL (ref 13–17.7)
HGB UR QL STRIP.AUTO: ABNORMAL
HOLD SPECIMEN: NORMAL
HYALINE CASTS UR QL AUTO: ABNORMAL /LPF
IMM GRANULOCYTES # BLD AUTO: 0.02 10*3/MM3 (ref 0–0.05)
IMM GRANULOCYTES NFR BLD AUTO: 0.2 % (ref 0–0.5)
KETONES UR QL STRIP: NEGATIVE
LEUKOCYTE ESTERASE UR QL STRIP.AUTO: ABNORMAL
LIPASE SERPL-CCNC: 17 U/L (ref 13–60)
LYMPHOCYTES # BLD AUTO: 2.57 10*3/MM3 (ref 0.7–3.1)
LYMPHOCYTES NFR BLD AUTO: 30 % (ref 19.6–45.3)
MCH RBC QN AUTO: 27.5 PG (ref 26.6–33)
MCHC RBC AUTO-ENTMCNC: 32.7 G/DL (ref 31.5–35.7)
MCV RBC AUTO: 84.2 FL (ref 79–97)
METHADONE UR QL SCN: POSITIVE
MONOCYTES # BLD AUTO: 0.65 10*3/MM3 (ref 0.1–0.9)
MONOCYTES NFR BLD AUTO: 7.6 % (ref 5–12)
NEUTROPHILS NFR BLD AUTO: 5.1 10*3/MM3 (ref 1.7–7)
NEUTROPHILS NFR BLD AUTO: 59.4 % (ref 42.7–76)
NITRITE UR QL STRIP: NEGATIVE
NRBC BLD AUTO-RTO: 0 /100 WBC (ref 0–0.2)
OPIATES UR QL: NEGATIVE
OXYCODONE UR QL SCN: NEGATIVE
PH UR STRIP.AUTO: 5.5 [PH] (ref 5–8)
PLATELET # BLD AUTO: 205 10*3/MM3 (ref 140–450)
PMV BLD AUTO: 10.4 FL (ref 6–12)
POTASSIUM SERPL-SCNC: 4.1 MMOL/L (ref 3.5–5.2)
PROT SERPL-MCNC: 7.4 G/DL (ref 6–8.5)
PROT UR QL STRIP: NEGATIVE
RBC # BLD AUTO: 5.38 10*6/MM3 (ref 4.14–5.8)
RBC # UR STRIP: ABNORMAL /HPF
REF LAB TEST METHOD: ABNORMAL
SARS-COV-2 RNA RESP QL NAA+PROBE: NOT DETECTED
SODIUM SERPL-SCNC: 141 MMOL/L (ref 136–145)
SP GR UR STRIP: 1.02 (ref 1–1.03)
SQUAMOUS #/AREA URNS HPF: ABNORMAL /HPF
UROBILINOGEN UR QL STRIP: ABNORMAL
WBC # UR STRIP: ABNORMAL /HPF
WBC NRBC COR # BLD AUTO: 8.58 10*3/MM3 (ref 3.4–10.8)
WHOLE BLOOD HOLD SPECIMEN: NORMAL

## 2024-08-12 PROCEDURE — 81001 URINALYSIS AUTO W/SCOPE: CPT | Performed by: PHYSICIAN ASSISTANT

## 2024-08-12 PROCEDURE — 80307 DRUG TEST PRSMV CHEM ANLYZR: CPT | Performed by: PHYSICIAN ASSISTANT

## 2024-08-12 PROCEDURE — 96375 TX/PRO/DX INJ NEW DRUG ADDON: CPT

## 2024-08-12 PROCEDURE — 25010000002 PROCHLORPERAZINE 10 MG/2ML SOLUTION: Performed by: PHYSICIAN ASSISTANT

## 2024-08-12 PROCEDURE — 85025 COMPLETE CBC W/AUTO DIFF WBC: CPT | Performed by: PHYSICIAN ASSISTANT

## 2024-08-12 PROCEDURE — 87636 SARSCOV2 & INF A&B AMP PRB: CPT | Performed by: PHYSICIAN ASSISTANT

## 2024-08-12 PROCEDURE — 83690 ASSAY OF LIPASE: CPT | Performed by: PHYSICIAN ASSISTANT

## 2024-08-12 PROCEDURE — 25010000002 DIPHENHYDRAMINE PER 50 MG: Performed by: PHYSICIAN ASSISTANT

## 2024-08-12 PROCEDURE — 82077 ASSAY SPEC XCP UR&BREATH IA: CPT | Performed by: PHYSICIAN ASSISTANT

## 2024-08-12 PROCEDURE — 80053 COMPREHEN METABOLIC PANEL: CPT | Performed by: PHYSICIAN ASSISTANT

## 2024-08-12 PROCEDURE — 99284 EMERGENCY DEPT VISIT MOD MDM: CPT

## 2024-08-12 PROCEDURE — 70450 CT HEAD/BRAIN W/O DYE: CPT

## 2024-08-12 PROCEDURE — 25810000003 SODIUM CHLORIDE 0.9 % SOLUTION

## 2024-08-12 PROCEDURE — 96374 THER/PROPH/DIAG INJ IV PUSH: CPT

## 2024-08-12 RX ORDER — PROCHLORPERAZINE EDISYLATE 5 MG/ML
10 INJECTION INTRAMUSCULAR; INTRAVENOUS ONCE
Status: COMPLETED | OUTPATIENT
Start: 2024-08-12 | End: 2024-08-12

## 2024-08-12 RX ORDER — SODIUM CHLORIDE 0.9 % (FLUSH) 0.9 %
10 SYRINGE (ML) INJECTION AS NEEDED
Status: DISCONTINUED | OUTPATIENT
Start: 2024-08-12 | End: 2024-08-12 | Stop reason: HOSPADM

## 2024-08-12 RX ORDER — DIPHENHYDRAMINE HYDROCHLORIDE 50 MG/ML
25 INJECTION INTRAMUSCULAR; INTRAVENOUS ONCE
Status: COMPLETED | OUTPATIENT
Start: 2024-08-12 | End: 2024-08-12

## 2024-08-12 RX ADMIN — SODIUM CHLORIDE 1000 ML: 9 INJECTION, SOLUTION INTRAVENOUS at 17:21

## 2024-08-12 RX ADMIN — DIPHENHYDRAMINE HYDROCHLORIDE 25 MG: 50 INJECTION, SOLUTION INTRAMUSCULAR; INTRAVENOUS at 15:15

## 2024-08-12 RX ADMIN — PROCHLORPERAZINE EDISYLATE 10 MG: 5 INJECTION INTRAMUSCULAR; INTRAVENOUS at 15:15

## 2024-08-12 NOTE — ED NOTES
Patient unable to urinate. Patient refused catheter. Patient dozing in and out of consciousness. Provider notified.

## 2024-08-12 NOTE — ED PROVIDER NOTES
Subjective   History of Present Illness  Chief Complaint: Abdominal pain headache    Patient is a 33-year-old male with no significant past medical history presents to the ER with complaints of headache abdominal pain for few days.  Patient states abdominal pain is subsided since arriving the emergency department but does feel somewhat nauseous.  He does report 1-2 episodes diarrhea no hematochezia or melena.  No vomiting.  He is complaining of vague gradual headache that has progressed without worse over the last couple days.  No thunderclap onset not the worst headache of his life.  He does not normally get migraines.  No dizziness or blurry vision.  No unilateral weakness or paresthesias.  Subjective fever and chills.  Patient states that his daughter at home was sick with URI and thinks he may have caught something.    PCP: Korey Funes    History provided by:  Patient      Review of Systems   Constitutional:  Negative for fever.   HENT:  Negative for sore throat and trouble swallowing.    Eyes: Negative.    Respiratory:  Negative for shortness of breath and wheezing.    Cardiovascular:  Negative for chest pain.   Gastrointestinal:  Positive for abdominal pain, diarrhea and nausea. Negative for vomiting.   Endocrine: Negative.    Genitourinary:  Negative for dysuria.   Musculoskeletal:  Positive for myalgias.   Skin:  Negative for rash.   Allergic/Immunologic: Negative.    Neurological:  Positive for headaches. Negative for light-headedness.   Psychiatric/Behavioral:  Negative for behavioral problems.    All other systems reviewed and are negative.      Past Medical History:   Diagnosis Date    Arm pain        No Known Allergies    Past Surgical History:   Procedure Laterality Date    DENTAL PROCEDURE      TONSILLECTOMY         Family History   Problem Relation Age of Onset    Diabetes Father        Social History     Socioeconomic History    Marital status: Single   Tobacco Use    Smoking status: Every Day      Current packs/day: 1.00     Types: Cigarettes    Smokeless tobacco: Never   Substance and Sexual Activity    Alcohol use: Never    Drug use: No    Sexual activity: Defer           Objective   Physical Exam  Vitals and nursing note reviewed.   Constitutional:       General: He is not in acute distress.     Appearance: He is well-developed and normal weight. He is not ill-appearing.   Eyes:      Extraocular Movements: Extraocular movements intact.      Pupils: Pupils are equal, round, and reactive to light.   Cardiovascular:      Rate and Rhythm: Normal rate and regular rhythm.      Heart sounds: Normal heart sounds. No murmur heard.  Pulmonary:      Effort: Pulmonary effort is normal.      Breath sounds: Normal breath sounds.   Abdominal:      General: Abdomen is flat. Bowel sounds are normal.      Palpations: Abdomen is soft.      Tenderness: There is no abdominal tenderness.   Skin:     Capillary Refill: Capillary refill takes less than 2 seconds.   Neurological:      General: No focal deficit present.      Mental Status: He is alert and oriented to person, place, and time.   Psychiatric:         Mood and Affect: Mood normal.         Behavior: Behavior normal.         Procedures           ED Course  ED Course as of 08/14/24 0100   Mon Aug 12, 2024   1646 Waiting for urinalysis [MC]   1702 Patient reevaluated, he is drowsy likely from Benadryl.  Reports his headache and abdominal pain have completely subsided.  Requested patient to provide urine sample or would require straight cath to obtain sample.  Patient verbalized understanding. [MC]   1715 Care handoff assumed from Ely BRADFORD pending disposition after UA and urine drug screen is obtained [KB]   6524 INSPECT reviewed with patient, he had dextroamphetamine 30 mg capsules filled on 8/9/2020 for quantity of 30 for 30 days.  alprazolam 1 mg tablets filled on 8/5/2020 for a quantity of 60 for 30 days.  And gabapentin 800 mg tablets filled on 7/26/2020 for quantity  "of 90 for 30 days [KB]   1915 On reassessment, patient states he feels significantly better at this time and would like to go home.  Drug screen positive for amphetamines, benzodiazepine, THC, methadone.  Patient is on Adderall, Xanax as needed, and had a dose of Benadryl prior to urine being obtained.  UA shows small amount of blood with trace leukocytes however no bacteria or white blood cells -denied UTI symptoms.  He denies any pain at time of assessment.  He was able to ambulate with nursing staff without difficulty, was ANO x 4.  Was able to tolerate p.o. challenge without nausea or vomiting.  Patient was discharged with instructions to follow-up with primary care on the outpatient basis for recheck.  Patient verbalized understanding and was agreeable with disposition [KB]      ED Course User Index  [KB] Farooq Hernandez APRN  [MC] Ely Hairston, PA    /68   Pulse 59   Temp 98.1 °F (36.7 °C) (Oral)   Resp 18   Ht 170.2 cm (67\")   Wt 90.3 kg (199 lb 1.2 oz)   SpO2 94%   BMI 31.18 kg/m²   Labs Reviewed   COVID-19 AND FLU A/B, NP SWAB IN TRANSPORT MEDIA 1 HR TAT - Normal    Narrative:     Fact sheet for providers: https://www.fda.gov/media/159573/download    Fact sheet for patients: https://www.fda.gov/media/339200/download    Test performed by PCR.   LIPASE - Normal   CBC WITH AUTO DIFFERENTIAL - Normal   RAINBOW DRAW    Narrative:     The following orders were created for panel order [place] Draw.  Procedure                               Abnormality         Status                     ---------                               -----------         ------                     Green Top (Gel)[[id number]]                                  Final result               Lavender Top[[id number]]                                     Final result                 Please view results for these tests on the individual orders.   COMPREHENSIVE METABOLIC PANEL    Narrative:     GFR Normal >60  Chronic Kidney Disease " <60  Kidney Failure <15     ETHANOL    Narrative:     Plasma Ethanol Clinical Symptoms:    ETOH (%)               Clinical Symptom  .01-.05              No apparent influence  .03-.12              Euphoria, Diminished judgment and attention   .09-.25              Impaired comprehension, Muscle incoordination  .18-.30              Confusion, Staggered gait, Slurred speech  .25-.40              Markedly decreased response to stimuli, unable to stand or                        walk, vomitting, sleep or stupor  .35-.50              Comatose, Anesthesia, Subnormal body temperature       URINALYSIS W/ MICROSCOPIC IF INDICATED (NO CULTURE)   URINE DRUG SCREEN   GREEN TOP   LAVENDER TOP   CBC AND DIFFERENTIAL    Narrative:     The following orders were created for panel order CBC & Differential.  Procedure                               Abnormality         Status                     ---------                               -----------         ------                     CBC Auto Differential[[id number]]        Normal              Final result                 Please view results for these tests on the individual orders.     Medications   sodium chloride 0.9 % flush 10 mL (has no administration in time range)   sodium chloride 0.9 % bolus 1,000 mL (has no administration in time range)   prochlorperazine (COMPAZINE) injection 10 mg (10 mg Intravenous Given 8/12/24 1515)   diphenhydrAMINE (BENADRYL) injection 25 mg (25 mg Intravenous Given 8/12/24 1515)     CT Head Without Contrast    Result Date: 8/12/2024  Impression: No acute intracranial abnormality. Electronically Signed: Brian Rankin MD  8/12/2024 2:33 PM EDT  Workstation ID: [id number]                                            Medical Decision Making  While in the ED IV was placed and labs were obtained appropriate PPE was worn during exam and throughout all encounters with the patient.  Patient is afebrile nontoxic appearance in no acute respiratory distress.  He complains  of severe headache, reports abdominal pain earlier today but not currently.  He has a nonfocal neurological exam.  He was given Compazine Benadryl and IV fluids in the emergency department.  Lab work reassuring.  No leukocytosis, electrolytes within normal limits.  Normal lipase.  COVID and flu negative.  CT head shows no acute intracranial hemorrhage.  On reevaluation patient appears drowsy likely secondary to Benadryl.  Patient care transferred to Farooq Hernandez NP pending UA and disposition.  Suspect patient can be discharged home, can be treated outpatient with antibiotics for UTI if this is present.    Problems Addressed:  Generalized abdominal pain: complicated acute illness or injury  Nonintractable headache, unspecified chronicity pattern, unspecified headache type: complicated acute illness or injury    Amount and/or Complexity of Data Reviewed  Labs: ordered. Decision-making details documented in ED Course.  Radiology: ordered. Decision-making details documented in ED Course.    Risk  Prescription drug management.        Final diagnoses:   Nonintractable headache, unspecified chronicity pattern, unspecified headache type   Generalized abdominal pain       ED Disposition  ED Disposition       None            No follow-up provider specified.       Medication List      No changes were made to your prescriptions during this visit.            Ely Hairston PA  08/14/24 0109

## 2024-08-12 NOTE — DISCHARGE INSTRUCTIONS
Continue to stay hydrated and take your medications as prescribed    Follow-up with primary care, call in the morning to make a follow-up appointment    Return with any new or worsening symptoms

## 2024-08-12 NOTE — Clinical Note
[facility] EMERGENCY DEPARTMENT  [address] [facility] [place] [address]  Phone: [phone]    Bryce Lyon was seen and treated in our emergency department on 8/12/2024.  He may return to work on 08/14/2024.         Thank you for choosing [facility].    Farooq Hernandez APRN